# Patient Record
Sex: FEMALE | Race: WHITE | NOT HISPANIC OR LATINO
[De-identification: names, ages, dates, MRNs, and addresses within clinical notes are randomized per-mention and may not be internally consistent; named-entity substitution may affect disease eponyms.]

---

## 2017-03-20 ENCOUNTER — APPOINTMENT (OUTPATIENT)
Dept: OBGYN | Facility: CLINIC | Age: 52
End: 2017-03-20

## 2017-03-31 ENCOUNTER — APPOINTMENT (OUTPATIENT)
Dept: OBGYN | Facility: CLINIC | Age: 52
End: 2017-03-31

## 2017-03-31 VITALS
HEIGHT: 64 IN | WEIGHT: 122 LBS | SYSTOLIC BLOOD PRESSURE: 120 MMHG | DIASTOLIC BLOOD PRESSURE: 70 MMHG | BODY MASS INDEX: 20.83 KG/M2

## 2017-04-03 LAB — HPV HIGH+LOW RISK DNA PNL CVX: NEGATIVE

## 2017-04-05 ENCOUNTER — MEDICATION RENEWAL (OUTPATIENT)
Age: 52
End: 2017-04-05

## 2017-04-05 LAB — CYTOLOGY CVX/VAG DOC THIN PREP: NORMAL

## 2017-05-22 ENCOUNTER — OTHER (OUTPATIENT)
Age: 52
End: 2017-05-22

## 2017-09-13 ENCOUNTER — APPOINTMENT (OUTPATIENT)
Dept: RHEUMATOLOGY | Facility: CLINIC | Age: 52
End: 2017-09-13

## 2017-10-09 ENCOUNTER — APPOINTMENT (OUTPATIENT)
Dept: RHEUMATOLOGY | Facility: CLINIC | Age: 52
End: 2017-10-09
Payer: COMMERCIAL

## 2017-10-09 VITALS
HEART RATE: 82 BPM | SYSTOLIC BLOOD PRESSURE: 130 MMHG | WEIGHT: 127 LBS | TEMPERATURE: 98.4 F | OXYGEN SATURATION: 98 % | DIASTOLIC BLOOD PRESSURE: 82 MMHG | BODY MASS INDEX: 21.68 KG/M2 | HEIGHT: 64 IN

## 2017-10-09 DIAGNOSIS — M54.5 LOW BACK PAIN: ICD-10-CM

## 2017-10-09 PROCEDURE — 99204 OFFICE O/P NEW MOD 45 MIN: CPT

## 2017-10-23 ENCOUNTER — APPOINTMENT (OUTPATIENT)
Dept: RHEUMATOLOGY | Facility: CLINIC | Age: 52
End: 2017-10-23

## 2017-11-01 LAB
25(OH)D3 SERPL-MCNC: 32.6 NG/ML
ACE BLD-CCNC: 49 U/L
ALBUMIN SERPL ELPH-MCNC: 4.6 G/DL
ALP BLD-CCNC: 65 U/L
ALT SERPL-CCNC: 39 U/L
ANA PAT FLD IF-IMP: ABNORMAL
ANA SER IF-ACNC: ABNORMAL
ANION GAP SERPL CALC-SCNC: 15 MMOL/L
AST SERPL-CCNC: 41 U/L
B BURGDOR AB SER-IMP: NEGATIVE
B BURGDOR IGM PATRN SER IB-IMP: NEGATIVE
B BURGDOR18/20KD IGM SER QL IB: NORMAL
B BURGDOR18KD IGG SER QL IB: NORMAL
B BURGDOR23KD IGG SER QL IB: NORMAL
B BURGDOR23KD IGM SER QL IB: NORMAL
B BURGDOR28KD AB SER QL IB: NORMAL
B BURGDOR28KD IGG SER QL IB: NORMAL
B BURGDOR30KD AB SER QL IB: NORMAL
B BURGDOR30KD IGG SER QL IB: NORMAL
B BURGDOR31KD IGG SER QL IB: NORMAL
B BURGDOR31KD IGM SER QL IB: NORMAL
B BURGDOR39KD IGG SER QL IB: NORMAL
B BURGDOR39KD IGM SER QL IB: NORMAL
B BURGDOR41KD IGG SER QL IB: PRESENT
B BURGDOR41KD IGM SER QL IB: NORMAL
B BURGDOR45KD AB SER QL IB: NORMAL
B BURGDOR45KD IGG SER QL IB: NORMAL
B BURGDOR58KD AB SER QL IB: NORMAL
B BURGDOR58KD IGG SER QL IB: NORMAL
B BURGDOR66KD IGG SER QL IB: NORMAL
B BURGDOR66KD IGM SER QL IB: NORMAL
B BURGDOR93KD IGG SER QL IB: NORMAL
B BURGDOR93KD IGM SER QL IB: NORMAL
BASOPHILS # BLD AUTO: 0.05 K/UL
BASOPHILS NFR BLD AUTO: 1 %
BILIRUB SERPL-MCNC: 0.4 MG/DL
BUN SERPL-MCNC: 18 MG/DL
CALCIUM SERPL-MCNC: 10 MG/DL
CCP AB SER IA-ACNC: 22 UNITS
CHLORIDE SERPL-SCNC: 99 MMOL/L
CO2 SERPL-SCNC: 26 MMOL/L
CREAT SERPL-MCNC: 0.88 MG/DL
CRP SERPL-MCNC: <0.2 MG/DL
DSDNA AB SER-ACNC: <12 IU/ML
ENA SS-A AB SER IA-ACNC: <0.2 AL
ENA SS-B AB SER IA-ACNC: <0.2 AL
EOSINOPHIL # BLD AUTO: 0.3 K/UL
EOSINOPHIL NFR BLD AUTO: 5.8 %
ERYTHROCYTE [SEDIMENTATION RATE] IN BLOOD BY WESTERGREN METHOD: 8 MM/HR
GLUCOSE SERPL-MCNC: 102 MG/DL
HCT VFR BLD CALC: 43 %
HGB BLD-MCNC: 14.6 G/DL
HLA-B27 RELATED AG QL: NORMAL
IMM GRANULOCYTES NFR BLD AUTO: 0.2 %
LYMPHOCYTES # BLD AUTO: 1.87 K/UL
LYMPHOCYTES NFR BLD AUTO: 35.9 %
MAN DIFF?: NORMAL
MCHC RBC-ENTMCNC: 31.1 PG
MCHC RBC-ENTMCNC: 34 GM/DL
MCV RBC AUTO: 91.5 FL
MONOCYTES # BLD AUTO: 0.41 K/UL
MONOCYTES NFR BLD AUTO: 7.9 %
NEUTROPHILS # BLD AUTO: 2.57 K/UL
NEUTROPHILS NFR BLD AUTO: 49.2 %
PLATELET # BLD AUTO: 264 K/UL
POTASSIUM SERPL-SCNC: 4.3 MMOL/L
PROT SERPL-MCNC: 7.4 G/DL
RBC # BLD: 4.7 M/UL
RBC # FLD: 13 %
RF+CCP IGG SER-IMP: ABNORMAL
RHEUMATOID FACT SER QL: <7 IU/ML
SODIUM SERPL-SCNC: 140 MMOL/L
TSH SERPL-ACNC: 2.19 UIU/ML
WBC # FLD AUTO: 5.21 K/UL

## 2017-11-13 ENCOUNTER — APPOINTMENT (OUTPATIENT)
Dept: RHEUMATOLOGY | Facility: CLINIC | Age: 52
End: 2017-11-13
Payer: COMMERCIAL

## 2017-11-13 VITALS
SYSTOLIC BLOOD PRESSURE: 128 MMHG | OXYGEN SATURATION: 98 % | HEART RATE: 59 BPM | HEIGHT: 64 IN | WEIGHT: 127 LBS | BODY MASS INDEX: 21.68 KG/M2 | DIASTOLIC BLOOD PRESSURE: 76 MMHG

## 2017-11-13 DIAGNOSIS — Z23 ENCOUNTER FOR IMMUNIZATION: ICD-10-CM

## 2017-11-13 DIAGNOSIS — Z82.61 FAMILY HISTORY OF ARTHRITIS: ICD-10-CM

## 2017-11-13 PROCEDURE — 90686 IIV4 VACC NO PRSV 0.5 ML IM: CPT

## 2017-11-13 PROCEDURE — G0008: CPT

## 2017-11-13 PROCEDURE — 99214 OFFICE O/P EST MOD 30 MIN: CPT | Mod: 25

## 2017-11-14 PROBLEM — Z23 FLU VACCINE NEED: Status: ACTIVE | Noted: 2017-11-14

## 2017-11-14 PROBLEM — Z82.61 FAMILY HISTORY OF ARTHRITIS: Status: ACTIVE | Noted: 2017-11-14

## 2017-12-11 ENCOUNTER — APPOINTMENT (OUTPATIENT)
Dept: RHEUMATOLOGY | Facility: CLINIC | Age: 52
End: 2017-12-11

## 2018-03-19 ENCOUNTER — APPOINTMENT (OUTPATIENT)
Dept: OBGYN | Facility: CLINIC | Age: 53
End: 2018-03-19

## 2018-06-11 ENCOUNTER — APPOINTMENT (OUTPATIENT)
Dept: OBGYN | Facility: CLINIC | Age: 53
End: 2018-06-11
Payer: COMMERCIAL

## 2018-06-11 VITALS
HEIGHT: 64.5 IN | BODY MASS INDEX: 21.76 KG/M2 | DIASTOLIC BLOOD PRESSURE: 75 MMHG | SYSTOLIC BLOOD PRESSURE: 130 MMHG | WEIGHT: 129.03 LBS

## 2018-06-11 DIAGNOSIS — Z01.419 ENCOUNTER FOR GYNECOLOGICAL EXAMINATION (GENERAL) (ROUTINE) W/OUT ABNORMAL FINDINGS: ICD-10-CM

## 2018-06-11 PROCEDURE — 99396 PREV VISIT EST AGE 40-64: CPT

## 2018-08-29 ENCOUNTER — APPOINTMENT (OUTPATIENT)
Dept: RHEUMATOLOGY | Facility: CLINIC | Age: 53
End: 2018-08-29
Payer: COMMERCIAL

## 2018-08-29 ENCOUNTER — LABORATORY RESULT (OUTPATIENT)
Age: 53
End: 2018-08-29

## 2018-08-29 VITALS
WEIGHT: 126 LBS | HEART RATE: 97 BPM | SYSTOLIC BLOOD PRESSURE: 130 MMHG | DIASTOLIC BLOOD PRESSURE: 80 MMHG | OXYGEN SATURATION: 97 % | HEIGHT: 65 IN | BODY MASS INDEX: 20.99 KG/M2

## 2018-08-29 DIAGNOSIS — M25.50 PAIN IN UNSPECIFIED JOINT: ICD-10-CM

## 2018-08-29 LAB
ALBUMIN SERPL ELPH-MCNC: 4.5 G/DL
ALP BLD-CCNC: 64 U/L
ALT SERPL-CCNC: 25 U/L
ANION GAP SERPL CALC-SCNC: 15 MMOL/L
AST SERPL-CCNC: 34 U/L
BASOPHILS # BLD AUTO: 0.04 K/UL
BASOPHILS NFR BLD AUTO: 1 %
BILIRUB SERPL-MCNC: 0.4 MG/DL
BUN SERPL-MCNC: 15 MG/DL
CALCIUM SERPL-MCNC: 9.9 MG/DL
CHLORIDE SERPL-SCNC: 103 MMOL/L
CO2 SERPL-SCNC: 25 MMOL/L
CREAT SERPL-MCNC: 0.85 MG/DL
CREAT SPEC-SCNC: 88 MG/DL
CREAT/PROT UR: 0.1 RATIO
CRP SERPL-MCNC: 0.11 MG/DL
EOSINOPHIL # BLD AUTO: 0.22 K/UL
EOSINOPHIL NFR BLD AUTO: 5.3 %
ERYTHROCYTE [SEDIMENTATION RATE] IN BLOOD BY WESTERGREN METHOD: 10 MM/HR
GLUCOSE SERPL-MCNC: 100 MG/DL
HCT VFR BLD CALC: 43.1 %
HGB BLD-MCNC: 14.5 G/DL
IMM GRANULOCYTES NFR BLD AUTO: 0 %
LYMPHOCYTES # BLD AUTO: 1.58 K/UL
LYMPHOCYTES NFR BLD AUTO: 38.3 %
MAN DIFF?: NORMAL
MCHC RBC-ENTMCNC: 31.3 PG
MCHC RBC-ENTMCNC: 33.6 GM/DL
MCV RBC AUTO: 92.9 FL
MONOCYTES # BLD AUTO: 0.29 K/UL
MONOCYTES NFR BLD AUTO: 7 %
NEUTROPHILS # BLD AUTO: 2 K/UL
NEUTROPHILS NFR BLD AUTO: 48.4 %
PLATELET # BLD AUTO: 247 K/UL
POTASSIUM SERPL-SCNC: 4.5 MMOL/L
PROT SERPL-MCNC: 7.6 G/DL
PROT UR-MCNC: 9 MG/DL
RBC # BLD: 4.64 M/UL
RBC # FLD: 13.2 %
SODIUM SERPL-SCNC: 143 MMOL/L
WBC # FLD AUTO: 4.13 K/UL

## 2018-08-29 PROCEDURE — 99214 OFFICE O/P EST MOD 30 MIN: CPT

## 2018-09-02 ENCOUNTER — RX RENEWAL (OUTPATIENT)
Age: 53
End: 2018-09-02

## 2018-09-04 LAB
C3 SERPL-MCNC: 108 MG/DL
C4 SERPL-MCNC: 27 MG/DL
DSDNA AB SER-ACNC: 12 IU/ML
HBV SURFACE AG SER QL: NONREACTIVE
HCV AB SER QL: NONREACTIVE
HCV S/CO RATIO: 0.31 S/CO
HIV1+2 AB SPEC QL IA.RAPID: NONREACTIVE
HIVABINT: NORMAL
M TB IFN-G BLD-IMP: NEGATIVE
QUANTIFERON TB PLUS MITOGEN MINUS NIL: >10 IU/ML
QUANTIFERON TB PLUS NIL: 0.02 IU/ML
QUANTIFERON TB PLUS TB1 MINUS NIL: 0 IU/ML
QUANTIFERON TB PLUS TB2 MINUS NIL: 0 IU/ML

## 2018-10-17 ENCOUNTER — APPOINTMENT (OUTPATIENT)
Dept: RHEUMATOLOGY | Facility: CLINIC | Age: 53
End: 2018-10-17

## 2018-11-02 ENCOUNTER — APPOINTMENT (OUTPATIENT)
Dept: RHEUMATOLOGY | Facility: CLINIC | Age: 53
End: 2018-11-02

## 2018-11-02 ENCOUNTER — APPOINTMENT (OUTPATIENT)
Dept: RHEUMATOLOGY | Facility: CLINIC | Age: 53
End: 2018-11-02
Payer: COMMERCIAL

## 2018-11-02 VITALS
BODY MASS INDEX: 20.99 KG/M2 | WEIGHT: 126 LBS | SYSTOLIC BLOOD PRESSURE: 139 MMHG | OXYGEN SATURATION: 97 % | HEIGHT: 65 IN | DIASTOLIC BLOOD PRESSURE: 90 MMHG | HEART RATE: 69 BPM

## 2018-11-02 DIAGNOSIS — M25.519 PAIN IN UNSPECIFIED SHOULDER: ICD-10-CM

## 2018-11-02 PROCEDURE — 99213 OFFICE O/P EST LOW 20 MIN: CPT

## 2019-01-28 ENCOUNTER — MEDICATION RENEWAL (OUTPATIENT)
Age: 54
End: 2019-01-28

## 2019-03-04 ENCOUNTER — APPOINTMENT (OUTPATIENT)
Dept: RHEUMATOLOGY | Facility: CLINIC | Age: 54
End: 2019-03-04

## 2019-08-05 ENCOUNTER — RX RENEWAL (OUTPATIENT)
Age: 54
End: 2019-08-05

## 2019-09-18 ENCOUNTER — APPOINTMENT (OUTPATIENT)
Dept: RHEUMATOLOGY | Facility: CLINIC | Age: 54
End: 2019-09-18
Payer: COMMERCIAL

## 2019-09-18 VITALS
HEART RATE: 82 BPM | SYSTOLIC BLOOD PRESSURE: 150 MMHG | HEIGHT: 65 IN | BODY MASS INDEX: 20.99 KG/M2 | WEIGHT: 126 LBS | DIASTOLIC BLOOD PRESSURE: 90 MMHG | OXYGEN SATURATION: 98 %

## 2019-09-18 PROCEDURE — 99214 OFFICE O/P EST MOD 30 MIN: CPT

## 2019-09-21 NOTE — REVIEW OF SYSTEMS
[As Noted in HPI] : as noted in HPI [Joint Pain] : joint pain [Fever] : no fever [Chills] : no chills [Earache] : no earache [Red Eyes] : eyes not red [Eye Pain] : no eye pain [Nosebleeds] : no nosebleeds [Chest Pain] : no chest pain [Abdominal Pain] : no abdominal pain [Shortness Of Breath] : no shortness of breath [Vomiting] : no vomiting [Dysuria] : no dysuria [Skin Lesions] : no skin lesions [Confused] : no confusion [Convulsions] : no convulsions [Suicidal] : not suicidal [Proptosis] : no proptosis [Muscle Weakness] : no muscle weakness [Easy Bleeding] : no tendency for easy bleeding

## 2019-09-21 NOTE — HISTORY OF PRESENT ILLNESS
[FreeTextEntry1] : 55 y/o F here for follow up w/ RA w/ +CCP on Plaquenil that seemed to help her joint aches in the hands/MCPs/PIPs/ elbows and sees Optho for eye exams on it. \par Today states she notes some pain in the hands around the MCP/PIP; elbows; knee at times that is not fully controlled on HCQ per patient.\par States morning stiffness is all day. 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 \par States Advil dose help but not long enough and takes 400-600mg a day. \par No fever/chills, no rashes, no ulcers, feels dry eyes and will see Optho to check, + dry mouth, no raynaud's, no GI/ symptoms at this time.\par \par

## 2019-09-21 NOTE — PHYSICAL EXAM
[General Appearance - Alert] : alert [General Appearance - Well Nourished] : well nourished [Sclera] : the sclera and conjunctiva were normal [Extraocular Movements] : extraocular movements were intact [Outer Ear] : the ears and nose were normal in appearance [Nasal Cavity] : the nasal mucosa and septum were normal [Neck Appearance] : the appearance of the neck was normal [Respiration, Rhythm And Depth] : normal respiratory rhythm and effort [Auscultation Breath Sounds / Voice Sounds] : lungs were clear to auscultation bilaterally [Heart Rate And Rhythm] : heart rate was normal and rhythm regular [Heart Sounds] : normal S1 and S2 [Bowel Sounds] : normal bowel sounds [Abdomen Soft] : soft [Abdomen Tenderness] : non-tender [Cervical Lymph Nodes Enlarged Anterior Bilaterally] : anterior cervical [Supraclavicular Lymph Nodes Enlarged Bilaterally] : supraclavicular [No CVA Tenderness] : no ~M costovertebral angle tenderness [No Spinal Tenderness] : no spinal tenderness [Motor Tone] : muscle strength and tone were normal [] : no rash [Cranial Nerves] : cranial nerves 2-12 were intact [No Focal Deficits] : no focal deficits [Impaired Insight] : insight and judgment were intact [Mood] : the mood was normal [FreeTextEntry1] : tenderness in the Rt hand 2-3MCP and left hand 2-3PIP

## 2019-09-21 NOTE — ASSESSMENT
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 \par Notes some secondary Sjogren symptoms of dry eyes and dry mouth at times.\par -no recent labs in over a year and encouraged compliance \par -refill HCQ PO BID \par -seeing Optho regularly in 2 mo and on HCQ BID  \par -dry eyes: uses artificial tears PRN\par -dry mouth: biotene mouthwash PRN\par -referred for xray hands again to evaluate for structural changes, periarticular osteopenia, r/o erosions\par -referred for xray c-spine for neck pain to r/o AA instability, DDD \par \par Osteopenia: Dexa 5/5/2017 w/ osteopenia -1.9 \par -new Dexa referral provided \par -encouraged Ca+vitD supplementation\par -encouraged weight bearing exercises as tolerated.\par \par -educated on symptoms to monitor for in detail and alert us if any concerns.\par -f/u in 4-6 weeks \par

## 2019-10-30 ENCOUNTER — RX RENEWAL (OUTPATIENT)
Age: 54
End: 2019-10-30

## 2019-11-13 ENCOUNTER — APPOINTMENT (OUTPATIENT)
Dept: RHEUMATOLOGY | Facility: CLINIC | Age: 54
End: 2019-11-13

## 2020-01-12 ENCOUNTER — TRANSCRIPTION ENCOUNTER (OUTPATIENT)
Age: 55
End: 2020-01-12

## 2020-02-07 ENCOUNTER — APPOINTMENT (OUTPATIENT)
Dept: OBGYN | Facility: CLINIC | Age: 55
End: 2020-02-07
Payer: COMMERCIAL

## 2020-02-07 VITALS
HEIGHT: 65 IN | DIASTOLIC BLOOD PRESSURE: 80 MMHG | RESPIRATION RATE: 16 BRPM | WEIGHT: 127 LBS | SYSTOLIC BLOOD PRESSURE: 118 MMHG | BODY MASS INDEX: 21.16 KG/M2

## 2020-02-07 DIAGNOSIS — Z78.0 ASYMPTOMATIC MENOPAUSAL STATE: ICD-10-CM

## 2020-02-07 PROCEDURE — 99396 PREV VISIT EST AGE 40-64: CPT

## 2020-02-07 NOTE — PHYSICAL EXAM
[Awake] : awake [Alert] : alert [Soft] : soft [Oriented x3] : oriented to person, place, and time [Vulvar Atrophy] : vulvar atrophy [Labia Minora] : labia minora [Labia Majora] : labia major [Atrophy] : atrophy [Normal] : clitoris [No Bleeding] : there was no active vaginal bleeding [Pap Obtained] : a Pap smear was performed [Uterine Adnexae] : were not tender and not enlarged [LAD] : no lymphadenopathy [Acute Distress] : no acute distress [Thyroid Nodule] : no thyroid nodule [Goiter] : no goiter [Mass] : no breast mass [Nipple Discharge] : no nipple discharge [Axillary LAD] : no axillary lymphadenopathy [Tender] : non tender [Distended] : not distended [H/Smegaly] : no hepatosplenomegaly [Depressed Mood] : not depressed [Flat Affect] : affect not flat

## 2020-02-10 LAB — HPV HIGH+LOW RISK DNA PNL CVX: NOT DETECTED

## 2020-07-02 ENCOUNTER — APPOINTMENT (OUTPATIENT)
Dept: RHEUMATOLOGY | Facility: CLINIC | Age: 55
End: 2020-07-02
Payer: COMMERCIAL

## 2020-07-02 PROCEDURE — 99214 OFFICE O/P EST MOD 30 MIN: CPT | Mod: 95

## 2020-07-02 RX ORDER — HYDROXYCHLOROQUINE SULFATE 200 MG/1
200 TABLET, FILM COATED ORAL
Qty: 60 | Refills: 3 | Status: DISCONTINUED | COMMUNITY
Start: 2018-11-02 | End: 2020-07-02

## 2020-07-02 RX ORDER — PREDNISONE 5 MG/1
5 TABLET ORAL
Qty: 40 | Refills: 0 | Status: DISCONTINUED | COMMUNITY
Start: 2018-11-02 | End: 2020-07-02

## 2020-07-02 NOTE — ASSESSMENT
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.\par Also notes aches in the hands, elbows, shoulders, knees, ankles, toes.\par Notes some secondary Sjogren symptoms of dry eyes and dry mouth at times.\par -labs 10/30/2017 w/ +CCP: +ROBERT 1:80 homogenous \par -admits she has not been taking the HCQ regularly w/ pain and swelling in b/l 2nd MCPs, ankles/toes today \par -agrees to taking regularly HCQ 1 tab daily alternating w/ 2 tabs PO BID, 300mg, closer to wt based w/ prescription sent as below  \par -states she saw Optho around 2/2020 and told ok to continue HCQ and will be following up for q 6mo appt at [facility]\par -dry eyes: uses refresh tears PRN from her Optho\par -dry mouth: discussed biotene mouthwash PRN\par -reviewed xray b/l hands 10/12/2018 w/ mild b/l scaphotrapezium osteoarthropathy \par -reviewed xray c-spine 10/12/2018 w/ discogenic disease and arthrosis \par \par +ROBERT 1:80 homogenous: discussed likely being seen in the setting of RA\par -Clinically without much symptoms of CTD/lupus at this time and educated on symptoms to monitor for in detail if she evolves.\par -lab as below w/ disease activity markers as below to be complete; markers were stable in 8/2018 w/ DS-DNA neg; C3/C4 WNL then; urine prot/creat ratio WNL \par -will check thyroid abs as discussed +ROBERT can be seen with cross reactivity\par \par Osteopenia: Dexa 5/5/2017 w/ osteopenia -1.9 \par -states she did Dexa around 1/2020 at [facility] and requesting results for follow up \par -encouraged Ca+vitD supplementation\par -encouraged weight bearing exercises as tolerated.\par \par -educated on symptoms to monitor for in detail and alert us if any concerns.\par -f/u in 6-8 weeks w/ labs, Dexa or sooner as needed  \par

## 2020-07-02 NOTE — PHYSICAL EXAM
[General Appearance - Well Nourished] : well nourished [General Appearance - Alert] : alert [Extraocular Movements] : extraocular movements were intact [Sclera] : the sclera and conjunctiva were normal [] : no rash [Impaired Insight] : insight and judgment were intact [Mood] : the mood was normal [FreeTextEntry1] : tenderness in b/l 2nd MCP on Rt hand and Lt hand; tenderness in the ankles

## 2020-07-02 NOTE — REVIEW OF SYSTEMS
[As Noted in HPI] : as noted in HPI [Chills] : no chills [Fever] : no fever [Eye Pain] : no eye pain [Red Eyes] : eyes not red [Earache] : no earache [Chest Pain] : no chest pain [Nosebleeds] : no nosebleeds [Shortness Of Breath] : no shortness of breath [Vomiting] : no vomiting [Abdominal Pain] : no abdominal pain [Dysuria] : no dysuria [Skin Lesions] : no skin lesions [Confused] : no confusion [Convulsions] : no convulsions [Suicidal] : not suicidal [Swollen Glands] : no swollen glands [Proptosis] : no proptosis [Easy Bleeding] : no tendency for easy bleeding

## 2020-07-02 NOTE — HISTORY OF PRESENT ILLNESS
[FreeTextEntry1] : Verbal consent given for telehealth video visit on 7/2/2020 by patient, via [email or web address] with visit done from my [employer] laptop at [address] [place] [address] and patient at her home.\par \par 56 y/o F for follow up w/ RA w/ +CCP on Plaquenil that seemed to help her joint aches in the hands/MCPs/PIPs/ elbows and sees Optho for eye exams on it. \par Today states she notes some pain in the hands around the b/l MCP/PIP; elbows; knee, ankles/toes that are not controlled and admits to taking the Plaquenil once in a while instead of regularly. \par States morning stiffness is all day. 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 \par States Advil dose help but not long enough and takes 400-600mg a day. \par No fever/chills, no rashes, no ulcers, + dry eyes on Refresh w/ Optho, + dry mouth, no raynaud's, no GI/ symptoms at this time.\par

## 2020-09-09 ENCOUNTER — RX RENEWAL (OUTPATIENT)
Age: 55
End: 2020-09-09

## 2020-10-29 ENCOUNTER — APPOINTMENT (OUTPATIENT)
Dept: RHEUMATOLOGY | Facility: CLINIC | Age: 55
End: 2020-10-29
Payer: COMMERCIAL

## 2020-10-29 PROCEDURE — 99214 OFFICE O/P EST MOD 30 MIN: CPT | Mod: 95

## 2020-10-29 NOTE — PHYSICAL EXAM
[General Appearance - Alert] : alert [General Appearance - Well Nourished] : well nourished [Sclera] : the sclera and conjunctiva were normal [Extraocular Movements] : extraocular movements were intact [Motor Tone] : muscle strength and tone were normal [] : no rash [Impaired Insight] : insight and judgment were intact [Mood] : the mood was normal [FreeTextEntry1] : no synovitis or effusion on exam noted today

## 2020-10-29 NOTE — HISTORY OF PRESENT ILLNESS
[FreeTextEntry1] : Verbal consent given for telehealth video visit on 10/29/2020 by patient, via [employer] with visit done from my [employer] laptop at [address] [place] [address] and patient at her home in [place].\par \par 56 y/o F for follow up w/ RA w/ +CCP on Plaquenil states it is helping her joint aches in the hands/MCPs/PIPs/ elbows/knee/ankles/toes. States she forgot to do more recent labs from last visit and had labs from PCP faxed from 12/2019.\par States eye exam on HCQ was ok w/ Optho in the past and will be going for f/u. \par States at times can note pain in her Rt hand 2nd digit and will monitor for any swelling.\par States morning stiffness is down to 15mins now from a few hours before.\par Denies any fever/chills, no rashes, no ulcers, + dry eyes on Refresh w/ Optho, + dry mouth, no raynaud's, no GI/ symptoms at this time.\par

## 2020-10-29 NOTE — REVIEW OF SYSTEMS
[As Noted in HPI] : as noted in HPI [Fever] : no fever [Chills] : no chills [Eye Pain] : no eye pain [Red Eyes] : eyes not red [Earache] : no earache [Nosebleeds] : no nosebleeds [Chest Pain] : no chest pain [Shortness Of Breath] : no shortness of breath [Abdominal Pain] : no abdominal pain [Dysuria] : no dysuria [Confused] : no confusion [Suicidal] : not suicidal [Proptosis] : no proptosis [Muscle Weakness] : no muscle weakness [Easy Bleeding] : no tendency for easy bleeding

## 2020-10-29 NOTE — ASSESSMENT
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.\par Also notes aches in the b/l hands, elbows, shoulders, knees, ankles, toes.\par Notes some secondary Sjogren symptoms of dry eyes and dry mouth at times.\par -labs 10/30/2017 w/ +CCP: +ROBERT 1:80 homogenous \par -reviewed last labs 12/13/2020 w/ normal ESR; CBC ok; CMP w/ minimally raised AST=40 (Nl up to 35); NL ALT\par -labs as below requested again please on HCQ\par -refill HCQ 1 tab daily alternating w/ 2 tabs PO BID, 300mg, closer to wt based w/ prescription sent as below \par -reported she saw Optho around 2/2020 and told ok to continue HCQ w/ Optho and will be following up for q 6mo appt \par -dry eyes: uses refresh tears PRN from her Optho\par -dry mouth: discussed biotene mouthwash PRN\par -xray b/l hands 10/12/2018 w/ mild b/l scaphotrapezium osteoarthropathy \par -xray c-spine 10/12/2018 w/ discogenic disease and arthrosis \par \par +ROBERT 1:80 homogenous: discussed likely being seen in the setting of RA\par -Clinically without much symptoms of CTD/lupus at this time and educated on symptoms to monitor for in detail if she evolves.\par -lab as below w/ disease activity markers as below to be complete; markers were stable in 8/2018 w/ DS-DNA neg; C3/C4 WNL then; urine prot/creat ratio WNL \par -will check thyroid abs as discussed +ROBERT can be seen with cross reactivity\par \par Osteopenia: on Dexa 12/20/2019\par -encouraged Ca+vitD supplementation\par -encouraged weight bearing exercises as tolerated.\par \par -educated on symptoms to monitor for in detail and alert us if any concerns.\par -knows to stay up to date on health maintenance w/ PCP\par -f/u in 4-6 weeks w/ labs or sooner as needed \par

## 2020-11-18 ENCOUNTER — RX RENEWAL (OUTPATIENT)
Age: 55
End: 2020-11-18

## 2020-12-15 ENCOUNTER — RX RENEWAL (OUTPATIENT)
Age: 55
End: 2020-12-15

## 2020-12-21 ENCOUNTER — APPOINTMENT (OUTPATIENT)
Dept: RHEUMATOLOGY | Facility: CLINIC | Age: 55
End: 2020-12-21

## 2021-01-16 ENCOUNTER — RX RENEWAL (OUTPATIENT)
Age: 56
End: 2021-01-16

## 2021-02-24 ENCOUNTER — RX RENEWAL (OUTPATIENT)
Age: 56
End: 2021-02-24

## 2021-03-24 ENCOUNTER — APPOINTMENT (OUTPATIENT)
Dept: RHEUMATOLOGY | Facility: CLINIC | Age: 56
End: 2021-03-24
Payer: COMMERCIAL

## 2021-03-24 PROCEDURE — 99214 OFFICE O/P EST MOD 30 MIN: CPT | Mod: 95

## 2021-03-24 NOTE — ASSESSMENT
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.\par Also notes aches in the b/l hands, elbows, shoulders, knees, ankles, toes intermittently.\par Notes some secondary Sjogren symptoms of dry eyes and dry mouth at times.\par -states today joint aches controlled on HCQ and defers needing to add DMARDs as discussed \par -labs 10/30/2017 w/ +CCP: +ROBERT 1:80 homogenous \par -reviewed last labs 11/6/2020 w/ normal ESR; ROBERT neg; RF negative; CBC/CMPO ok; Nl vitD; TSH normal\par -labs as below to monitor requested \par -refill HCQ 200mg 1 tab PO daily w/ G6PD normal w/ prescription sent as below & defers higher dose as feels palpations then\par -reported she saw Optho, at [facility] around 2/2020 and told ok to continue HCQ w/ Optho and will be following up soon & optho referral provided as discussed \par -dry eyes: uses refresh tears PRN from her Optho\par -dry mouth: discussed biotene mouthwash PRN\par -xray b/l hands 10/12/2018 w/ mild b/l scaphotrapezium osteoarthropathy \par -xray c-spine 10/12/2018 w/ discogenic disease and arthrosis \par \par +ROBERT 1:80 homogenous: discussed likely being seen in the setting of RA and negative ROBERT on repeat\par -Clinically without much symptoms of CTD/lupus at this time and educated on symptoms to monitor for in detail if she evolves.\par -disease activity markers were stable in 11/2020 w/ DS-DNA neg; C3/C4 WNL; ROBERT negative \par \par Osteopenia: on Dexa 12/20/2019\par -encouraged Ca+vitD supplementation\par -encouraged weight bearing exercises as tolerated.\par \par -educated on symptoms to monitor for in detail and alert us if any concerns.\par -knows to stay up to date on health maintenance w/ PCP\par -f/u in 2-3 mo w/ labs or sooner as needed \par . \par

## 2021-03-24 NOTE — PHYSICAL EXAM
[General Appearance - Alert] : alert [General Appearance - In No Acute Distress] : in no acute distress [Sclera] : the sclera and conjunctiva were normal [Extraocular Movements] : extraocular movements were intact [] : no rash [Impaired Insight] : insight and judgment were intact [Mood] : the mood was normal [FreeTextEntry1] : tenderness Rt hand 2-3MCP w/o much synovitis on telehealth video today; full ROM in b/l shoulders w/ mild Rt shoulder tenderness on rotation

## 2021-03-24 NOTE — HISTORY OF PRESENT ILLNESS
[FreeTextEntry1] : Verbal consent given for telehealth video visit on 3/24/2021 by patient, via [employer] with visit done from my [employer] laptop at [address] [place] [address] and patient at her home in [place].\par \par 57 y/o F for follow up w/ RA w/ +CCP on Plaquenil states it is helping her joint aches in the hands/MCPs/PIPs/ elbows/knee/ankles/toes. States she decreased her HCQ 300mg total to HCQ 200mg 1 tab daily and prefers to keep this dose as feels higher dose of HCQ causes palpations. \par States eye exam on HCQ was ok w/ Optho in the past and will be going for f/u. \par States at times can note pain in her Rt hand 2nd digit and will monitor for any swelling.\par States morning stiffness is down to 15 mins now from a few hours before.\par Has full ROM in b/l shoulders, no pelvic/girdle stiffness and able to stand up without using her hands, no temporal pain/unremitting headaches, no vision changes, no jaw pain.\par Denies any fever/chills, no rashes, no ulcers, + dry eyes on Refresh w/ Optho, + dry mouth at times, no raynaud's, no GI/ symptoms at this time.\par

## 2021-03-24 NOTE — REVIEW OF SYSTEMS
[As Noted in HPI] : as noted in HPI [Fever] : no fever [Chills] : no chills [Eye Pain] : no eye pain [Red Eyes] : eyes not red [Nosebleeds] : no nosebleeds [Chest Pain] : no chest pain [Shortness Of Breath] : no shortness of breath [Abdominal Pain] : no abdominal pain [Vomiting] : no vomiting [Dysuria] : no dysuria [Confused] : no confusion [Suicidal] : not suicidal [Proptosis] : no proptosis [Easy Bleeding] : no tendency for easy bleeding

## 2021-04-14 ENCOUNTER — RX RENEWAL (OUTPATIENT)
Age: 56
End: 2021-04-14

## 2021-05-27 ENCOUNTER — APPOINTMENT (OUTPATIENT)
Dept: RHEUMATOLOGY | Facility: CLINIC | Age: 56
End: 2021-05-27

## 2021-06-07 ENCOUNTER — RX RENEWAL (OUTPATIENT)
Age: 56
End: 2021-06-07

## 2021-07-07 ENCOUNTER — RX RENEWAL (OUTPATIENT)
Age: 56
End: 2021-07-07

## 2021-09-23 ENCOUNTER — APPOINTMENT (OUTPATIENT)
Dept: RHEUMATOLOGY | Facility: CLINIC | Age: 56
End: 2021-09-23

## 2021-09-29 ENCOUNTER — RX RENEWAL (OUTPATIENT)
Age: 56
End: 2021-09-29

## 2021-09-29 DIAGNOSIS — R76.8 OTHER SPECIFIED ABNORMAL IMMUNOLOGICAL FINDINGS IN SERUM: ICD-10-CM

## 2021-10-05 LAB
25(OH)D3 SERPL-MCNC: 83.9 NG/ML
ALBUMIN SERPL ELPH-MCNC: 4.8 G/DL
ALP BLD-CCNC: 69 U/L
ALT SERPL-CCNC: 20 U/L
ANA SER IF-ACNC: NEGATIVE
ANION GAP SERPL CALC-SCNC: 16 MMOL/L
AST SERPL-CCNC: 27 U/L
BASOPHILS # BLD AUTO: 0.07 K/UL
BASOPHILS NFR BLD AUTO: 1.1 %
BILIRUB SERPL-MCNC: 0.3 MG/DL
BUN SERPL-MCNC: 16 MG/DL
C3 SERPL-MCNC: 100 MG/DL
C4 SERPL-MCNC: 25 MG/DL
CALCIUM SERPL-MCNC: 9.7 MG/DL
CCP AB SER IA-ACNC: 12 UNITS
CHLORIDE SERPL-SCNC: 103 MMOL/L
CO2 SERPL-SCNC: 22 MMOL/L
CREAT SERPL-MCNC: 0.81 MG/DL
CREAT SPEC-SCNC: 67 MG/DL
CREAT/PROT UR: 0.1 RATIO
CRP SERPL-MCNC: <3 MG/L
DSDNA AB SER-ACNC: 18 IU/ML
EOSINOPHIL # BLD AUTO: 0.24 K/UL
EOSINOPHIL NFR BLD AUTO: 3.8 %
ERYTHROCYTE [SEDIMENTATION RATE] IN BLOOD BY WESTERGREN METHOD: 18 MM/HR
GLUCOSE SERPL-MCNC: 107 MG/DL
HAV IGM SER QL: NONREACTIVE
HBV CORE IGM SER QL: NONREACTIVE
HBV SURFACE AG SER QL: NONREACTIVE
HCT VFR BLD CALC: 42.8 %
HCV AB SER QL: NONREACTIVE
HCV S/CO RATIO: 0.11 S/CO
HGB BLD-MCNC: 14.3 G/DL
IMM GRANULOCYTES NFR BLD AUTO: 0.2 %
LYMPHOCYTES # BLD AUTO: 2.2 K/UL
LYMPHOCYTES NFR BLD AUTO: 34.8 %
MAN DIFF?: NORMAL
MCHC RBC-ENTMCNC: 30.8 PG
MCHC RBC-ENTMCNC: 33.4 GM/DL
MCV RBC AUTO: 92.2 FL
MONOCYTES # BLD AUTO: 0.56 K/UL
MONOCYTES NFR BLD AUTO: 8.8 %
NEUTROPHILS # BLD AUTO: 3.25 K/UL
NEUTROPHILS NFR BLD AUTO: 51.3 %
PLATELET # BLD AUTO: 261 K/UL
POTASSIUM SERPL-SCNC: 4.2 MMOL/L
PROT SERPL-MCNC: 7.1 G/DL
PROT UR-MCNC: 6 MG/DL
RBC # BLD: 4.64 M/UL
RBC # FLD: 12.4 %
RF+CCP IGG SER-IMP: NEGATIVE
RHEUMATOID FACT SER QL: <10 IU/ML
SODIUM SERPL-SCNC: 141 MMOL/L
WBC # FLD AUTO: 6.33 K/UL

## 2021-10-06 ENCOUNTER — APPOINTMENT (OUTPATIENT)
Dept: RHEUMATOLOGY | Facility: CLINIC | Age: 56
End: 2021-10-06
Payer: COMMERCIAL

## 2021-10-06 DIAGNOSIS — M25.569 PAIN IN UNSPECIFIED KNEE: ICD-10-CM

## 2021-10-06 PROCEDURE — 99214 OFFICE O/P EST MOD 30 MIN: CPT | Mod: 95

## 2021-10-06 NOTE — HISTORY OF PRESENT ILLNESS
[FreeTextEntry1] : Verbal consent given for telehealth video visit on 10/6/2021 by patient, via [employer] with visit done from my [employer]phone at [address] [place] [address] and patient at her home on her I-phone.\par \par 55 y/o F for follow up w/ RA w/ hx of weak +CCP on Plaquenil states it is helping her joint aches in the hands/MCPs/PIPs/ elbows/knee/ankles/toes. States she is tolerating HCQ 200mg daily well and had deferred higher dose that caused palpation in the past. \par Today she states she notes achy pain in the b/l hands throughout, b/l wrists w/o effusion. States she will monitor for any swelling.\par States she notes intermittent pain in her toes.\par States she notes some intermittent achy pain rated 4/10 for severity in her knees w/o swelling or warmth w/ activity/stairs at times and denies any crystal arthropathy like attacks.\par States eye exam on HCQ was ok w/ Optho yesterday and told ok to continue HCQ w/ Optho, Dr. Dhaliwal. \par Has full ROM in b/l shoulders, no pelvic/girdle stiffness and able to stand up without using her hands, no temporal pain/unremitting headaches, no vision changes, no jaw pain.\par Denies any fever/chills, no rashes, no ulcers, + dry eyes was on Refresh w/ Optho, + dry mouth at times, no raynaud's, no GI/ symptoms at this time.\par

## 2021-10-06 NOTE — ASSESSMENT
[FreeTextEntry1] : 57 y/o F w/ hx of weak +CCP with pain in b/l hands around the MCPs and PIPs daily w/ swelling and stiffness with symptoms concerning for RA.\par Also notes aches in the b/l hands, elbows, shoulders, knees, ankles, toes intermittently.\par Notes some secondary Sjogren symptoms of dry eyes and dry mouth at times.\par -reviewed recent labs 9/30/2021 w/ normal ESR/CRP; CCP negative now; RF negative; ROBERT w/ IF negative; CBC/CMP ok; vit D high (to cut down on dose)  \par -taking HCQ 200mg daily, closer to wt based w/ reports of pain in hands/wrists\par -Referred for Ultrasound of b/l hands/wrists to evaluate for any subclinical synovitis\par -labs 10/30/2017 w/ +CCP: +ROBERT 1:80 homogenous \par -refill HCQ 200mg 1 tab PO daily w/ G6PD normal w/ prescription sent as below & had deferred higher dose as felt palpations then \par -reports she saw Dr. Dhaliwal 10/5/42687 and told ok to continue HCQ w/ Optho and will ask if dry eyes noted again & reports hx of of dry eyes w/ prior Optho\par -dry eyes: artificial tears PRN and discuss w/ Optho\par -dry mouth: discussed biotene mouthwash or spray or toothpaste PRN\par -xray b/l hands 10/12/2018 w/ mild b/l scaphotrapezium osteoarthropathy \par -xray c-spine 10/12/2018 w/ discogenic disease and arthrosis \par -Referred for xray of b/l feet to evaluate for structural changes, r/o erosions\par \par Knee pain -Referred for xray of b/l knees to evaluate for structural changes, OA\par -consider steroid injections \par \par +ROBERT 1:80 homogenous: discussed likely being seen in the setting of RA and Negative ROBRET on repeat\par -Clinically without much symptoms of CTD/lupus at this time and educated on symptoms to monitor for in detail if she evolves.\par -disease activity markers were stable in 11/2020 w/ DS-DNA neg; C3/C4 WNL; ROBERT negative \par \par Osteopenia: on Dexa 12/20/2019\par -Dexa referral put in to do after 12/20/2021\par -encouraged Ca+vitD supplementation\par -encouraged weight bearing exercises as tolerated.\par \par -educated on symptoms to monitor for in detail and alert us if any concerns.\par -knows to stay up to date on health maintenance w/ PCP\par -f/u in 2-3 mo w/ US hands, labs, xrays, Dexa or sooner as needed \par . \par

## 2021-10-06 NOTE — PHYSICAL EXAM
[General Appearance - Alert] : alert [General Appearance - In No Acute Distress] : in no acute distress [Sclera] : the sclera and conjunctiva were normal [Extraocular Movements] : extraocular movements were intact [] : no rash [Impaired Insight] : insight and judgment were intact [Mood] : the mood was normal [FreeTextEntry1] : tenderness Rt hand 2-3 PIP; LT hand 3-4 PIP; no synovitis noted over video camera per se

## 2021-12-02 ENCOUNTER — APPOINTMENT (OUTPATIENT)
Dept: OBGYN | Facility: CLINIC | Age: 56
End: 2021-12-02

## 2021-12-16 ENCOUNTER — RESULT CHARGE (OUTPATIENT)
Age: 56
End: 2021-12-16

## 2021-12-16 ENCOUNTER — APPOINTMENT (OUTPATIENT)
Dept: FAMILY MEDICINE | Facility: CLINIC | Age: 56
End: 2021-12-16
Payer: COMMERCIAL

## 2021-12-16 VITALS
HEIGHT: 65 IN | HEART RATE: 90 BPM | OXYGEN SATURATION: 98 % | SYSTOLIC BLOOD PRESSURE: 130 MMHG | DIASTOLIC BLOOD PRESSURE: 86 MMHG | WEIGHT: 128 LBS | BODY MASS INDEX: 21.33 KG/M2 | TEMPERATURE: 96.6 F

## 2021-12-16 DIAGNOSIS — R10.9 UNSPECIFIED ABDOMINAL PAIN: ICD-10-CM

## 2021-12-16 LAB
BILIRUB UR QL STRIP: NORMAL
GLUCOSE UR-MCNC: NORMAL
HCG UR QL: 0.2 EU/DL
HGB UR QL STRIP.AUTO: NORMAL
KETONES UR-MCNC: NORMAL
LEUKOCYTE ESTERASE UR QL STRIP: NORMAL
NITRITE UR QL STRIP: NORMAL
PH UR STRIP: 5.5
PROT UR STRIP-MCNC: NORMAL
SP GR UR STRIP: 1.02

## 2021-12-16 PROCEDURE — 99203 OFFICE O/P NEW LOW 30 MIN: CPT | Mod: 25

## 2021-12-16 PROCEDURE — 36415 COLL VENOUS BLD VENIPUNCTURE: CPT

## 2021-12-16 NOTE — PLAN
[FreeTextEntry1] : Will check blood and urine tests as above. Will order ultrasound of the gallbladder and kidneys. She will hydrate well and try lemon water since this may help to dissolve a kidney stone. More definitive treatment will be recommended once her test results are known.

## 2021-12-16 NOTE — PHYSICAL EXAM
[Soft] : abdomen soft [Non Tender] : non-tender [Non-distended] : non-distended [Normal Bowel Sounds] : normal bowel sounds [No CVA Tenderness] : no CVA  tenderness [Grossly Normal Strength/Tone] : grossly normal strength/tone [No Focal Deficits] : no focal deficits [Normal] : affect was normal and insight and judgment were intact

## 2021-12-16 NOTE — HISTORY OF PRESENT ILLNESS
[FreeTextEntry8] : Patient presents with right sided flank and abdominal pain. This began 3 weeks ago. She thought she pulled a muscle but this has not resolved. She felt that her right side looked swollen. She has a history of IBS and has had increased nausea but no vomiting. She has had no change in her bowel movements. She feels that has increased abdominal pain from certain foods and drinks (wine). She wonders if this is a gallstone or kidney stone. She has no dysuria and no hematuria.

## 2021-12-16 NOTE — ASSESSMENT
[FreeTextEntry1] : She has right flank pain radiating to the right upper and lower quadrant of her abdomen. Ddx includes a kidney stone or gallstones. She has no abdominal or flank tenderness but she does have moderate microscopic hematuria on automated U/A in the office. This suggests a renal stone more than a gallstone. She admits that she is often dehydrated.

## 2021-12-17 LAB
ALBUMIN SERPL ELPH-MCNC: 4.8 G/DL
ALP BLD-CCNC: 67 U/L
ALT SERPL-CCNC: 24 U/L
ANION GAP SERPL CALC-SCNC: 12 MMOL/L
APPEARANCE: CLEAR
AST SERPL-CCNC: 36 U/L
BACTERIA: NEGATIVE
BASOPHILS # BLD AUTO: 0.05 K/UL
BASOPHILS NFR BLD AUTO: 1.2 %
BILIRUB SERPL-MCNC: 0.5 MG/DL
BILIRUBIN URINE: NEGATIVE
BLOOD URINE: ABNORMAL
BUN SERPL-MCNC: 15 MG/DL
CALCIUM SERPL-MCNC: 9.7 MG/DL
CHLORIDE SERPL-SCNC: 103 MMOL/L
CO2 SERPL-SCNC: 26 MMOL/L
COLOR: YELLOW
CREAT SERPL-MCNC: 0.83 MG/DL
EOSINOPHIL # BLD AUTO: 0.22 K/UL
EOSINOPHIL NFR BLD AUTO: 5.4 %
GLUCOSE QUALITATIVE U: NEGATIVE
GLUCOSE SERPL-MCNC: 114 MG/DL
HCT VFR BLD CALC: 43.6 %
HGB BLD-MCNC: 14.2 G/DL
HYALINE CASTS: 0 /LPF
IMM GRANULOCYTES NFR BLD AUTO: 0.2 %
KETONES URINE: NEGATIVE
LEUKOCYTE ESTERASE URINE: NEGATIVE
LYMPHOCYTES # BLD AUTO: 1.59 K/UL
LYMPHOCYTES NFR BLD AUTO: 39.3 %
MAN DIFF?: NORMAL
MCHC RBC-ENTMCNC: 30.5 PG
MCHC RBC-ENTMCNC: 32.6 GM/DL
MCV RBC AUTO: 93.6 FL
MICROSCOPIC-UA: NORMAL
MONOCYTES # BLD AUTO: 0.4 K/UL
MONOCYTES NFR BLD AUTO: 9.9 %
NEUTROPHILS # BLD AUTO: 1.78 K/UL
NEUTROPHILS NFR BLD AUTO: 44 %
NITRITE URINE: NEGATIVE
PH URINE: 6
PLATELET # BLD AUTO: 244 K/UL
POTASSIUM SERPL-SCNC: 4 MMOL/L
PROT SERPL-MCNC: 7.2 G/DL
PROTEIN URINE: NORMAL
RBC # BLD: 4.66 M/UL
RBC # FLD: 12.7 %
RED BLOOD CELLS URINE: 2 /HPF
SODIUM SERPL-SCNC: 141 MMOL/L
SPECIFIC GRAVITY URINE: 1.02
SQUAMOUS EPITHELIAL CELLS: 2 /HPF
UROBILINOGEN URINE: NORMAL
WBC # FLD AUTO: 4.05 K/UL
WHITE BLOOD CELLS URINE: 2 /HPF

## 2021-12-19 LAB — BACTERIA UR CULT: NORMAL

## 2021-12-20 ENCOUNTER — NON-APPOINTMENT (OUTPATIENT)
Age: 56
End: 2021-12-20

## 2021-12-20 ENCOUNTER — TRANSCRIPTION ENCOUNTER (OUTPATIENT)
Age: 56
End: 2021-12-20

## 2021-12-23 ENCOUNTER — NON-APPOINTMENT (OUTPATIENT)
Age: 56
End: 2021-12-23

## 2022-01-06 ENCOUNTER — APPOINTMENT (OUTPATIENT)
Dept: OBGYN | Facility: CLINIC | Age: 57
End: 2022-01-06
Payer: COMMERCIAL

## 2022-01-06 VITALS
SYSTOLIC BLOOD PRESSURE: 134 MMHG | BODY MASS INDEX: 21.13 KG/M2 | WEIGHT: 127 LBS | TEMPERATURE: 97.8 F | DIASTOLIC BLOOD PRESSURE: 80 MMHG

## 2022-01-06 PROCEDURE — 82270 OCCULT BLOOD FECES: CPT

## 2022-01-06 PROCEDURE — 99396 PREV VISIT EST AGE 40-64: CPT

## 2022-01-06 RX ORDER — ESTRADIOL 10 UG/1
10 TABLET VAGINAL
Qty: 30 | Refills: 1 | Status: COMPLETED | COMMUNITY
Start: 2017-03-31 | End: 2022-01-06

## 2022-01-06 NOTE — PHYSICAL EXAM

## 2022-01-06 NOTE — HISTORY OF PRESENT ILLNESS
[FreeTextEntry1] : 57 yo uses a natural vag lub. not estrogen. She has no bleeding or hot flashes. No gyn complaints last colonoscopy 2 yrs ago just had dexa no results in computer.  [Currently Active] : currently active [Men] : men [Vaginal] : vaginal [No] : No

## 2022-01-06 NOTE — DISCUSSION/SUMMARY
[FreeTextEntry1] : pt was taking too much vit D \par Discussed with the pt the importance of exercise weight bearing,yoga, aerobics good variety, \par calcium totalling  mg between food and vitamins also vitamin D 2000iu daily, fish oil. \par ALso that any drop of blood after menapause is not good. \par FU in one year. \par  Discussed vaginal lubricants OTC like luvena moisturizers,ky ovule and relplense . There are also vaginal extrogens, and coconut oil with intercourse.\par discussed colonoscopy and derma appt.\par \par

## 2022-01-08 LAB — HPV HIGH+LOW RISK DNA PNL CVX: NOT DETECTED

## 2022-01-10 LAB — CYTOLOGY CVX/VAG DOC THIN PREP: ABNORMAL

## 2022-01-27 ENCOUNTER — TRANSCRIPTION ENCOUNTER (OUTPATIENT)
Age: 57
End: 2022-01-27

## 2022-01-27 ENCOUNTER — APPOINTMENT (OUTPATIENT)
Dept: RHEUMATOLOGY | Facility: CLINIC | Age: 57
End: 2022-01-27
Payer: COMMERCIAL

## 2022-01-27 DIAGNOSIS — M79.674 PAIN IN RIGHT TOE(S): ICD-10-CM

## 2022-01-27 DIAGNOSIS — M79.675 PAIN IN RIGHT TOE(S): ICD-10-CM

## 2022-01-27 DIAGNOSIS — Z71.2 PERSON CONSULTING FOR EXPLANATION OF EXAMINATION OR TEST FINDINGS: ICD-10-CM

## 2022-01-27 PROCEDURE — 99214 OFFICE O/P EST MOD 30 MIN: CPT | Mod: 95

## 2022-01-27 NOTE — PHYSICAL EXAM
[General Appearance - Alert] : alert [General Appearance - In No Acute Distress] : in no acute distress [Sclera] : the sclera and conjunctiva were normal [Extraocular Movements] : extraocular movements were intact [] : no rash [Impaired Insight] : insight and judgment were intact [Mood] : the mood was normal [FreeTextEntry1] : no synovitis or effusion on exam noted today; good ROM in b/l shoulders

## 2022-01-27 NOTE — REASON FOR VISIT
[Follow-Up: _____] : a [unfilled] follow-up visit [FreeTextEntry1] : RA; review labs/UA hands/wrists/Dexa/meds; Osteopenia. \par  \par

## 2022-01-27 NOTE — ASSESSMENT
[FreeTextEntry1] : 58 y/o F w/ hx of weak +CCP=22 with pain in b/l hands around the MCPs and PIPs daily w/ swelling and stiffness with symptoms concerning for RA.\par Also notes aches in the b/l hands, elbows, shoulders, knees, ankles, toes intermittently.\par Notes some secondary Sjogren symptoms of dry eyes and dry mouth at times.\par -reviewed labs 12/16/21 w/ CBC/CMP ok; urine w/ small blood (to f/u w/ her urologist and states monitors there from the past also w/ workup negative then & will follow up); 12/14/21 normal ESR/CRP; RO/LA negative \par -labs 9/30/2021 w/ normal ESR/CRP; CCP negative now on HCQ; RF negative; ROBERT w/ IF negative; CBC/CMP ok; vit D high (to cut down on dose); labs 10/30/2017 w/ +CCP=22: +ROBERT 1:80 homogenous \par -refill HCQ 200mg daily, closer to wt based w/ G6PD normal w/ prescription sent as below & had deferred higher dose as felt palpations then \par -labs as below to monitor \par -Reviewed US b/l hands/wrists on HCQ from [facility] radiology with tiny ganglion cyst in the Rt radial dorsal wrist without effusion or synovitis  \par -reports she saw Dr. Dhaliwal 10/5/60732 and told ok to continue HCQ w/ Optho w/ f/u in 6 mo and will ask if dry eyes noted again & reports hx of of dry eyes w/ prior Optho\par -dry eyes: artificial tears PRN and discuss w/ Optho\par -dry mouth: discussed biotene mouthwash or spray or toothpaste PRN\par -xray b/l hands 10/12/2018 w/ mild b/l scaphotrapezium osteoarthropathy \par -xray c-spine 10/12/2018 w/ discogenic disease and arthrosis \par -she defers other xrays \par \par +ROBERT 1:80 homogenous: discussed likely being seen in the setting of RA and Negative ROBERT on repeat\par -Clinically without much symptoms of CTD/lupus at this time and educated on symptoms to monitor for in detail if she evolves.\par -disease activity markers were stable in 11/2020 w/ DS-DNA neg; C3/C4 WNL; ROBERT negative \par \par Osteopenia: reviewed on Dexa 11/8/21\par -encouraged Ca+vitD supplementation\par -encouraged weight bearing exercises as tolerated.\par \par -educated on symptoms to monitor for in detail and alert us if any concerns.\par -knows to stay up to date on health maintenance w/ PCP\par -f/u in 3-4 mo w/ labs or sooner as needed \par . \par  \par

## 2022-01-28 ENCOUNTER — TRANSCRIPTION ENCOUNTER (OUTPATIENT)
Age: 57
End: 2022-01-28

## 2022-01-28 DIAGNOSIS — R31.9 HEMATURIA, UNSPECIFIED: ICD-10-CM

## 2022-02-21 ENCOUNTER — FORM ENCOUNTER (OUTPATIENT)
Age: 57
End: 2022-02-21

## 2022-03-23 ENCOUNTER — APPOINTMENT (OUTPATIENT)
Dept: RHEUMATOLOGY | Facility: CLINIC | Age: 57
End: 2022-03-23
Payer: COMMERCIAL

## 2022-03-23 VITALS
SYSTOLIC BLOOD PRESSURE: 165 MMHG | TEMPERATURE: 97.6 F | WEIGHT: 126 LBS | OXYGEN SATURATION: 99 % | HEART RATE: 79 BPM | BODY MASS INDEX: 20.97 KG/M2 | DIASTOLIC BLOOD PRESSURE: 105 MMHG

## 2022-03-23 VITALS — DIASTOLIC BLOOD PRESSURE: 80 MMHG | SYSTOLIC BLOOD PRESSURE: 120 MMHG

## 2022-03-23 DIAGNOSIS — M70.61 TROCHANTERIC BURSITIS, RIGHT HIP: ICD-10-CM

## 2022-03-23 PROCEDURE — 99214 OFFICE O/P EST MOD 30 MIN: CPT

## 2022-03-23 RX ORDER — CALCIUM CARBONATE/VITAMIN D3 600 MG-10
600-400 TABLET ORAL
Qty: 60 | Refills: 2 | Status: ACTIVE | COMMUNITY
Start: 2021-03-24 | End: 1900-01-01

## 2022-03-23 NOTE — HISTORY OF PRESENT ILLNESS
[FreeTextEntry1] : 56 y/o F for follow up w/ RA w/ hx of weak +CCP on Plaquenil states it is helping her joint aches in the hands/MCPs/PIPs/ elbows/knee/ankles/toes. States she is tolerating HCQ 200mg daily well and had deferred higher dose that caused palpation in the past. \par Today she has no swelling or redness or warmth of any joints on the  mg daily at this time.\par States eye exam on HCQ was ok w/ Optho 10/5/2021 and told ok to continue HCQ w/ Optho, Dr. Dhaliwal. \par Reports of some right sided hip pain on the right side of the hip today and no pain in the groin/hip joint so defers xray. Defers steroid injection offered for possible trochanteric bursitis. \par Reports of some soreness in the Rt shoulder w/ rotation w/ decent ROM and defers imaging of it or steroid injection for possible bursitis at this time.\yolanda Has good ROM in b/l shoulders, no pelvic/girdle stiffness and able to stand up without using her hands, no temporal pain/unremitting headaches, no vision changes, no jaw pain.\par Denies any fever/chills, no rashes, no ulcers, + dry eyes was on Refresh w/ Optho, + dry mouth at times, no raynaud's, no GI/ symptoms at this time.\par \par

## 2022-03-23 NOTE — REVIEW OF SYSTEMS
[As Noted in HPI] : as noted in HPI [Fever] : no fever [Chills] : no chills [Red Eyes] : eyes not red [Eye Pain] : no eye pain [Nosebleeds] : no nosebleeds [Chest Pain] : no chest pain [Shortness Of Breath] : no shortness of breath [Abdominal Pain] : no abdominal pain [Dysuria] : no dysuria [Confused] : no confusion [Suicidal] : not suicidal [Muscle Weakness] : no muscle weakness [Easy Bleeding] : no tendency for easy bleeding

## 2022-03-23 NOTE — PHYSICAL EXAM
[General Appearance - Alert] : alert [General Appearance - In No Acute Distress] : in no acute distress [Sclera] : the sclera and conjunctiva were normal [Extraocular Movements] : extraocular movements were intact [Outer Ear] : the ears and nose were normal in appearance [Neck Appearance] : the appearance of the neck was normal [Respiration, Rhythm And Depth] : normal respiratory rhythm and effort [Heart Rate And Rhythm] : heart rate was normal and rhythm regular [Heart Sounds] : normal S1 and S2 [Abdomen Soft] : soft [Abdomen Tenderness] : non-tender [Cervical Lymph Nodes Enlarged Anterior Bilaterally] : anterior cervical [Supraclavicular Lymph Nodes Enlarged Bilaterally] : supraclavicular [No CVA Tenderness] : no ~M costovertebral angle tenderness [Motor Tone] : muscle strength and tone were normal [] : no rash [No Focal Deficits] : no focal deficits [Impaired Insight] : insight and judgment were intact [Mood] : the mood was normal [FreeTextEntry1] : no synovitis or effusion on exam noted today; good ROM in b/l shoulders w/ some Rt shoulder tenderness w/ rotation; Rt trochanteric bursa tenderness

## 2022-03-23 NOTE — ASSESSMENT
[FreeTextEntry1] : 56 y/o F w/ hx of weak +CCP=22 with pain in b/l hands around the MCPs and PIPs daily w/ swelling and stiffness with symptoms concerning for RA.\par Also notes aches in the b/l hands, elbows, shoulders, knees, ankles, toes intermittently.\par Notes some secondary Sjogren symptoms of dry eyes and dry mouth at times.\par -No synovitis or effusion on exam noted today on  mg daily at this time\par -reviewed labs 12/16/21 w/ CBC/CMP ok; urine w/ small blood (saw her urologist to monitors there from the past also w/ workup negative reported); 12/14/21 normal ESR/CRP; RO/LA negative \par -labs 9/30/2021 w/ normal ESR/CRP; CCP negative now on HCQ; RF negative; ROBERT w/ IF negative; CBC/CMP ok; vit D high (to cut down on dose); labs 10/30/2017 w/ +CCP=22: +ROBERT 1:80 homogenous \par -refill HCQ 200mg daily, closer to wt based w/ G6PD normal w/ prescription sent as below & had deferred higher dose as felt palpations then \par -labs as below to monitor \par -US b/l hands/wrists on HCQ from [facility] with tiny ganglion cyst in the Rt radial dorsal wrist without effusion or synovitis on HCQ\par -reports she saw Dr. Dhaliwal 10/5/76625 and told ok to continue HCQ w/ Optho w/ f/u in 6 mo and will ask if dry eyes noted again & reports hx of of dry eyes w/ prior Optho\par -dry eyes: refresh PRN and discuss w/ Optho\par -dry mouth: discussed biotene mouthwash or spray or toothpaste PRN\par -xray b/l hands 10/12/2018 w/ mild b/l scaphotrapezium osteoarthropathy \par -xray c-spine 10/12/2018 w/ discogenic disease and arthrosis \par -she defers any xrays or imaging at this time\par \par Rt shoulder pain/bursitis: offered steroid injection and defers it.\par -defers imaging of shoulder and knows if pain worse can consider it \par -can use tylenol PRN or motrin w/ food sparingly if needed \par -Has good ROM in b/l shoulders, no pelvic/girdle stiffness and able to stand up without using her hands, no temporal pain/unremitting headaches, no vision changes, no jaw pain.\par  \par Rt trochanteric bursitis: defers steroid injection offered at this time\par -no hip/groin tenderness at this time\par \par +ROBRET 1:80 homogenous: discussed likely being seen in the setting of RA and Negative ROBERT on repeat\par -Clinically without much symptoms of CTD/lupus at this time and educated on symptoms to monitor for in detail if she evolves.\par -disease activity markers were stable in 9/30/21 w/ DS-DNA neg; C3/C4 WNL; ROBERT negative \par \par Osteopenia: on Dexa 11/8/21\par -encouraged Ca+vitD supplementation\par -encouraged weight bearing exercises as tolerated.\par \par -educated on symptoms to monitor for in detail and alert us if any concerns.\par -knows to stay up to date on health maintenance w/ PCP\par -f/u in 3 mo w/ labs or sooner as needed \par

## 2022-06-13 ENCOUNTER — NON-APPOINTMENT (OUTPATIENT)
Age: 57
End: 2022-06-13

## 2022-06-14 ENCOUNTER — APPOINTMENT (OUTPATIENT)
Dept: FAMILY MEDICINE | Facility: CLINIC | Age: 57
End: 2022-06-14
Payer: COMMERCIAL

## 2022-06-14 ENCOUNTER — NON-APPOINTMENT (OUTPATIENT)
Age: 57
End: 2022-06-14

## 2022-06-14 VITALS
HEIGHT: 65 IN | OXYGEN SATURATION: 99 % | BODY MASS INDEX: 20.66 KG/M2 | DIASTOLIC BLOOD PRESSURE: 96 MMHG | TEMPERATURE: 97.7 F | WEIGHT: 124 LBS | HEART RATE: 99 BPM | SYSTOLIC BLOOD PRESSURE: 160 MMHG

## 2022-06-14 VITALS — DIASTOLIC BLOOD PRESSURE: 86 MMHG | SYSTOLIC BLOOD PRESSURE: 152 MMHG

## 2022-06-14 DIAGNOSIS — R00.2 PALPITATIONS: ICD-10-CM

## 2022-06-14 PROCEDURE — 93000 ELECTROCARDIOGRAM COMPLETE: CPT

## 2022-06-14 PROCEDURE — 99213 OFFICE O/P EST LOW 20 MIN: CPT | Mod: 25

## 2022-06-14 RX ORDER — ACETAMINOPHEN 325 MG/1
TABLET, FILM COATED ORAL
Refills: 0 | Status: DISCONTINUED | COMMUNITY
End: 2022-06-14

## 2022-06-14 RX ORDER — IBUPROFEN 200 MG/1
TABLET, COATED ORAL
Refills: 0 | Status: DISCONTINUED | COMMUNITY
End: 2022-06-14

## 2022-06-14 NOTE — PHYSICAL EXAM
[No Murmur] : no murmur heard [No Edema] : there was no peripheral edema [Normal] : no rash [Coordination Grossly Intact] : coordination grossly intact [No Focal Deficits] : no focal deficits [Normal Gait] : normal gait [Normal Affect] : the affect was normal [Normal Insight/Judgement] : insight and judgment were intact [de-identified] : borderline tachycardic, rare extra beat appreciated

## 2022-06-14 NOTE — HEALTH RISK ASSESSMENT
[Former] : Former [Yes] : Yes [2 - 3 times a week (3 pts)] : 2 - 3  times a week (3 points) [1 or 2 (0 pts)] : 1 or 2 (0 points) [Never (0 pts)] : Never (0 points) [No] : In the past 12 months have you used drugs other than those required for medical reasons? No [No falls in past year] : Patient reported no falls in the past year [0] : 2) Feeling down, depressed, or hopeless: Not at all (0) [PHQ-2 Negative - No further assessment needed] : PHQ-2 Negative - No further assessment needed [YearQuit] : 1991 [Audit-CScore] : 3 [de-identified] : active, exercises regularly [de-identified] : good, balanced [YPH0Fomsm] : 0

## 2022-06-14 NOTE — ASSESSMENT
[FreeTextEntry1] : Patient is a 56yo female presenting to the office for evaluation of palpitations, disability doctor told her she had an extra heart beat and needs to be evaluated.  Pt has intermittent palpitations which are not new.  Denies CP, SOB.  No PE RF.\par \par Palpitations\par - EKG in office ST, no acute ST/T changes, no arrhythmias or extra beats on EKG.\par - BP elevated initially, improved slightly on repeat, admits to being very anxious.\par - Pt to be evaluated by Cardiology, Dr. Srinivasan, today for further evaluation and work up.\par - Offered b/w, pt declines.  States she has b/w done with Rheum regularly.  Will re-consider if persistent/worsening symptoms.\par \par Call the office or go to the ED immediately if you develop new, worsening or concerning symptoms including high fever, severe headache/worst headache of your life, confusion, dizziness/lightheadedness, loss of consciousness, severe chest pain, difficulty breathing, shortness of breath, severe abdominal pain, excessive vomiting/diarrhea, inability to feel/move the extremities, or any other concerning symptoms.\par

## 2022-06-14 NOTE — HISTORY OF PRESENT ILLNESS
[FreeTextEntry8] : Patient is a 58yo female presenting to the office complaining of palpitations.\par Pt states she was at a disability doctor appointment, was told she had an extra heart beat\par states she intermittently feels a "punch" feeling in the chest, attributed to anxiety.\par denies chest pain, SOB.\par Denies recent travel, recent surgeries, pain/swelling of the legs, hormone use, or history of blood clots.\par states she was having increased palpitations about 1-2 months ago, associated with allergy exacerbation, was not taking medications at that time.\par pt takes Calm supplement for anxiety, has Xanax PRN but does not take regularly.\par does not follow with psychiatry/therapist\par \par pt has never seen Cardiology.\par Father history of CVA.  No other heart issues in the family.

## 2022-06-14 NOTE — REVIEW OF SYSTEMS
[Palpitations] : palpitations [Anxiety] : anxiety [Negative] : Heme/Lymph [Chest Pain] : no chest pain [Lower Ext Edema] : no lower extremity edema [Orthopnea] : no orthopnea [Suicidal] : not suicidal [Depression] : no depression

## 2022-06-22 ENCOUNTER — RX RENEWAL (OUTPATIENT)
Age: 57
End: 2022-06-22

## 2022-06-23 ENCOUNTER — APPOINTMENT (OUTPATIENT)
Dept: RHEUMATOLOGY | Facility: CLINIC | Age: 57
End: 2022-06-23

## 2022-07-10 ENCOUNTER — FORM ENCOUNTER (OUTPATIENT)
Age: 57
End: 2022-07-10

## 2022-07-28 ENCOUNTER — APPOINTMENT (OUTPATIENT)
Dept: OBGYN | Facility: CLINIC | Age: 57
End: 2022-07-28

## 2022-07-28 VITALS
HEIGHT: 65 IN | DIASTOLIC BLOOD PRESSURE: 80 MMHG | WEIGHT: 124 LBS | HEART RATE: 80 BPM | BODY MASS INDEX: 20.66 KG/M2 | SYSTOLIC BLOOD PRESSURE: 130 MMHG | RESPIRATION RATE: 16 BRPM

## 2022-07-28 DIAGNOSIS — N89.8 OTHER SPECIFIED NONINFLAMMATORY DISORDERS OF VAGINA: ICD-10-CM

## 2022-07-28 PROCEDURE — 99214 OFFICE O/P EST MOD 30 MIN: CPT

## 2022-07-28 NOTE — HISTORY OF PRESENT ILLNESS
[FreeTextEntry1] : 58 yo was having sex and her left labia minora cot a little cut after. she has vaginal dryness and uses a moisturizer 2x a week mostly .

## 2022-07-28 NOTE — PHYSICAL EXAM
[Appropriately responsive] : appropriately responsive [Alert] : alert [No Acute Distress] : no acute distress [Oriented x3] : oriented x3 [Vulvar Atrophy] : vulvar atrophy [Labia Majora] : normal [Labia Minora] : normal [Normal] : normal [Atrophy] : atrophy [FreeTextEntry4] : culture done

## 2022-08-01 LAB
CANDIDA VAG CYTO: NOT DETECTED
G VAGINALIS+PREV SP MTYP VAG QL MICRO: NOT DETECTED
T VAGINALIS VAG QL WET PREP: NOT DETECTED

## 2022-09-12 ENCOUNTER — RX RENEWAL (OUTPATIENT)
Age: 57
End: 2022-09-12

## 2022-09-13 ENCOUNTER — APPOINTMENT (OUTPATIENT)
Dept: FAMILY MEDICINE | Facility: CLINIC | Age: 57
End: 2022-09-13

## 2022-09-13 VITALS — DIASTOLIC BLOOD PRESSURE: 82 MMHG | SYSTOLIC BLOOD PRESSURE: 150 MMHG

## 2022-09-13 VITALS
WEIGHT: 124 LBS | HEIGHT: 65 IN | HEART RATE: 100 BPM | BODY MASS INDEX: 20.66 KG/M2 | OXYGEN SATURATION: 100 % | TEMPERATURE: 97 F

## 2022-09-13 PROCEDURE — 99396 PREV VISIT EST AGE 40-64: CPT | Mod: 25

## 2022-09-13 PROCEDURE — 36415 COLL VENOUS BLD VENIPUNCTURE: CPT

## 2022-09-13 NOTE — HISTORY OF PRESENT ILLNESS
[FreeTextEntry1] : CPE. [de-identified] : Patient is a 56yo female with PMH RA, HTN who presents to the office for CPE.  \par \par Last CPE:  12/11/2019, Liliana Gotti. \par GYN Exam:  01/06/2022, Dr. Alfaro.  Pap negative at that time. \par Mammogram:  05/2022, BIRADS-2, repeat 1 year. \par DEXA:  12/2021 osteopenia.  Repeat 2023. \par Colonoscopy:  02/2019 internal hemorrhoids, repeat 10 years. \par Ophthalmology:  due now, [facility].  Last visit  01/2022, plans to schedule in near future.  Has f/u every 6 months on Plaquenil.\par Dermatology:   UTD.\par Dentist:  UTD.\par Shingles:  hx chicken pox as a child.  Will consider, will discuss with Dr. Maciel.\par Flu:  declines.\par Tdap:  unknown last Tdap, recommended today, pt declines.\par COVID:  [employer] x2.\par LMP:  12 years ago, denies breakthrough bleeding.\par \par Pt seen at our office for evaluation of palpitations on 06/14/22.  Pt was referred to Cardiology, Dr. Mattson.  Pt had echo and stress testing which were negative.  Pt was started on Amlodipine 5mg daily and tolerating well.  Pt does not check BP at home.  Denies HA, dizziness, lightheadedness, CP, SOB, LE edema.\par \par Rheumatology:  Dr. Maciel.  Follows for RA.  Pt feels she has poor hand  strength.  Pt states Plaquenil is helping but still not where she would like to be.  Admits to intermittent pain.\par \par Pt has history of anxiety.  Takes Xanax sparingly as needed.  Last RF >1 year ago, ISTOP reference number [id number].  Requesting RF today.  Not interested in SSRIs.  Pt has mild depression 2/2 unable to work because of RA.  States she feels better when talking to friends, not interested in therapy or SSRIs.

## 2022-09-13 NOTE — HEALTH RISK ASSESSMENT
[Good] : ~his/her~  mood as  good [Never] : Never [Yes] : Yes [2 - 3 times a week (3 pts)] : 2 - 3  times a week (3 points) [1 or 2 (0 pts)] : 1 or 2 (0 points) [Never (0 pts)] : Never (0 points) [No] : In the past 12 months have you used drugs other than those required for medical reasons? No [No falls in past year] : Patient reported no falls in the past year [0] : 1) Little interest or pleasure doing things: Not at all (0) [1] : 2) Feeling down, depressed, or hopeless for several days (1) [PHQ-2 Negative - No further assessment needed] : PHQ-2 Negative - No further assessment needed [HIV test declined] : HIV test declined [Hepatitis C test declined] : Hepatitis C test declined [None] : None [With Significant Other] : lives with significant other [Unemployed] : unemployed [High School] : high school [] :  [# Of Children ___] : has [unfilled] children [Sexually Active] : sexually active [Feels Safe at Home] : Feels safe at home [Fully functional (bathing, dressing, toileting, transferring, walking, feeding)] : Fully functional (bathing, dressing, toileting, transferring, walking, feeding) [Fully functional (using the telephone, shopping, preparing meals, housekeeping, doing laundry, using] : Fully functional and needs no help or supervision to perform IADLs (using the telephone, shopping, preparing meals, housekeeping, doing laundry, using transportation, managing medications and managing finances) [Smoke Detector] : smoke detector [Carbon Monoxide Detector] : carbon monoxide detector [Seat Belt] :  uses seat belt [Sunscreen] : uses sunscreen [With Patient/Caregiver] : , with patient/caregiver [Reviewed no changes] : Reviewed, no changes [I will adhere to the patient's wishes.] : I will adhere to the patient's wishes. [No Retinopathy] : No retinopathy [Patient reported mammogram was normal] : Patient reported mammogram was normal [Patient reported PAP Smear was normal] : Patient reported PAP Smear was normal [Patient reported bone density results were abnormal] : Patient reported bone density results were abnormal [Patient reported colonoscopy was normal] : Patient reported colonoscopy was normal [de-identified] : socially as teenager [Audit-CScore] : 3 [de-identified] : exercises occasionally, limited 2/2 RA. [de-identified] : well balanced, trying to watch salt intake [[id number]] : 1 [EyeExamDate] : 01/2022 [Change in mental status noted] : No change in mental status noted [Language] : denies difficulty with language [High Risk Behavior] : no high risk behavior [Reports changes in hearing] : Reports no changes in hearing [Reports changes in vision] : Reports no changes in vision [Reports changes in dental health] : Reports no changes in dental health [Guns at Home] : no guns at home [Travel to Developing Areas] : does not  travel to developing areas [MammogramDate] : 05/2022 [PapSmearDate] : 01/2022 [BoneDensityDate] : 12/2021 [BoneDensityComments] : osteopenia, repeat 2 years [ColonoscopyDate] : 02/2019 [ColonoscopyComments] : internal hemorrhoids otherwise WNL, repeat 10 years [FreeTextEntry2] : previously dental hygienist; 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

## 2022-09-13 NOTE — REVIEW OF SYSTEMS
[Joint Pain] : joint pain [Joint Stiffness] : joint stiffness [Anxiety] : anxiety [Depression] : depression [Negative] : Heme/Lymph [Suicidal] : not suicidal [Insomnia] : no insomnia [FreeTextEntry9] : chronic joint pain/stiffness 2/2 RA

## 2022-09-13 NOTE — ASSESSMENT
[FreeTextEntry1] : Patient is a 56yo female with PMH RA, HTN who presents to the office for CPE.  \par \par Health Maintenance\par - Due for Shingrix, will consult with Rheumatology to determine if eligible on current medication regimen.\par - Due for flu shot and Tdap, both offered today and declined.\par - UTD with remainder of HCM.\par - EKG deferred, pt UTD with Cardiology and has no active complaints.  Has f/u scheduled with Dr. Srinivasan in 10/2022.\par - Fasting labs drawn in office.\par - Eat plenty of fruits and vegetables, especially deeply colored fruits/vegetables (such as leafy greens, peaches) that are more nutrient-dense.  Continue to work hard on diet and exercise, limiting/avoiding saturated fat, fatty foods, greasy foods, red meats, white flour-based carbohydrates (cookies, cakes, white bread, white rice), and added sugars.  Chose whole grain foods and products made with whole grains over refined grains and white flour-based carbohydrates.  Avoid beverages and food with added sugar.  Limit salt intake to improve blood pressure.  Limit alcohol intake.\par - Try and incorporate 30 minutes of aerobic exercise to your daily routine.\par \par Anxiety/Depression\par - Symptoms mild, mostly associated with progressing RA, unable to work.\par - PHQ-2 negative.\par - Uses Xanax very sparingly as needed.  Has not had RF in the past 1 year, ISTOP confirmed.\par - RX for Xanax 0.25mg daily PRN sent to pharmacy.  To be taken very sparingly.\par - Recommend therapist, pt declines.\par - Pt not interested in SSRIs at this time.\par - Pt knows to RTO if symptoms persist/worsen.\par \par RA\par - Continue Plaquenil and routine f/u with Rheumatology, Dr. Maciel.\par - Pt frustrated with decreased dexterity, weakness of the hands.  RX for PT provided.  Pt encouraged to research RA specialists.\par \par HTN\par - BP elevated in office today.  Pt admits she does not monitor BP at home.\par - Pt currently taking Amlodipine 5mg daily.  Pt had just taken BP medication when I walked into the exam room for evaluation.\par - Monitor blood pressure at home, keep log, alert office if persistently elevated to consider medication adjustment.\par - Pt has appointment for f/u with Dr. Srinivasan (Cardiology) in 10/2022.\par - Alert office if you develop any new, worsening or concerning symptoms including headache, vision changes, dizziness, loss of consciousness, chest pain, shortness of breath, or lower extremity edema.\par \par Call the office or go to the ED immediately if you develop new, worsening or concerning symptoms including high fever, severe headache/worst headache of your life, confusion, dizziness/lightheadedness, loss of consciousness, severe chest pain, difficulty breathing, shortness of breath, severe abdominal pain, excessive vomiting/diarrhea, inability to feel/move the extremities, or any other concerning symptoms.\par

## 2022-09-14 LAB
25(OH)D3 SERPL-MCNC: 50.6 NG/ML
ALBUMIN SERPL ELPH-MCNC: 4.7 G/DL
ALP BLD-CCNC: 67 U/L
ALT SERPL-CCNC: 21 U/L
ANION GAP SERPL CALC-SCNC: 13 MMOL/L
AST SERPL-CCNC: 24 U/L
BASOPHILS # BLD AUTO: 0.05 K/UL
BASOPHILS NFR BLD AUTO: 1.1 %
BILIRUB SERPL-MCNC: 0.4 MG/DL
BUN SERPL-MCNC: 14 MG/DL
CALCIUM SERPL-MCNC: 9.9 MG/DL
CHLORIDE SERPL-SCNC: 103 MMOL/L
CHOLEST SERPL-MCNC: 295 MG/DL
CO2 SERPL-SCNC: 27 MMOL/L
CREAT SERPL-MCNC: 0.78 MG/DL
EGFR: 89 ML/MIN/1.73M2
EOSINOPHIL # BLD AUTO: 0.23 K/UL
EOSINOPHIL NFR BLD AUTO: 4.9 %
ESTIMATED AVERAGE GLUCOSE: 120 MG/DL
GLUCOSE SERPL-MCNC: 102 MG/DL
HBA1C MFR BLD HPLC: 5.8 %
HCT VFR BLD CALC: 45.7 %
HDLC SERPL-MCNC: 91 MG/DL
HGB BLD-MCNC: 14.8 G/DL
IMM GRANULOCYTES NFR BLD AUTO: 0.2 %
LDLC SERPL CALC-MCNC: 168 MG/DL
LYMPHOCYTES # BLD AUTO: 1.53 K/UL
LYMPHOCYTES NFR BLD AUTO: 32.9 %
MAN DIFF?: NORMAL
MCHC RBC-ENTMCNC: 30.7 PG
MCHC RBC-ENTMCNC: 32.4 GM/DL
MCV RBC AUTO: 94.8 FL
MONOCYTES # BLD AUTO: 0.38 K/UL
MONOCYTES NFR BLD AUTO: 8.2 %
NEUTROPHILS # BLD AUTO: 2.45 K/UL
NEUTROPHILS NFR BLD AUTO: 52.7 %
NONHDLC SERPL-MCNC: 204 MG/DL
PLATELET # BLD AUTO: 232 K/UL
POTASSIUM SERPL-SCNC: 4.6 MMOL/L
PROT SERPL-MCNC: 7.2 G/DL
RBC # BLD: 4.82 M/UL
RBC # FLD: 13.2 %
SODIUM SERPL-SCNC: 144 MMOL/L
TRIGL SERPL-MCNC: 183 MG/DL
TSH SERPL-ACNC: 2.64 UIU/ML
WBC # FLD AUTO: 4.65 K/UL

## 2022-09-20 ENCOUNTER — NON-APPOINTMENT (OUTPATIENT)
Age: 57
End: 2022-09-20

## 2022-10-12 ENCOUNTER — RX RENEWAL (OUTPATIENT)
Age: 57
End: 2022-10-12

## 2022-10-17 ENCOUNTER — APPOINTMENT (OUTPATIENT)
Dept: FAMILY MEDICINE | Facility: CLINIC | Age: 57
End: 2022-10-17

## 2022-10-17 VITALS
HEIGHT: 65 IN | OXYGEN SATURATION: 99 % | SYSTOLIC BLOOD PRESSURE: 150 MMHG | BODY MASS INDEX: 20.66 KG/M2 | WEIGHT: 124 LBS | DIASTOLIC BLOOD PRESSURE: 90 MMHG | TEMPERATURE: 97.3 F | HEART RATE: 81 BPM

## 2022-10-17 PROCEDURE — 99213 OFFICE O/P EST LOW 20 MIN: CPT

## 2022-10-17 NOTE — HISTORY OF PRESENT ILLNESS
[FreeTextEntry8] : Pt is a 58yo female presenting to the office complaining of sinusitis, cough.\par Pt reports NC, PND, cough, earache since last Monday.\par Pt states she woke up last night with subjective fever with sweats, body aches.\par Pt states she coughing, productive of green/yellow phlegm.\par Denies chest pain or SOB.\par Pt's  was sick with similar.\par Pt has been COVID negative at home multiple times.

## 2022-10-17 NOTE — REVIEW OF SYSTEMS
[Earache] : earache [Nasal Discharge] : nasal discharge [Sore Throat] : sore throat [Postnasal Drip] : postnasal drip [Shortness Of Breath] : no shortness of breath [Wheezing] : no wheezing [Cough] : cough [Easy Bleeding] : no easy bleeding [Easy Bruising] : no easy bruising [Swollen Glands] : swollen glands [Negative] : Psychiatric

## 2022-10-17 NOTE — PHYSICAL EXAM
[No Edema] : there was no peripheral edema [Normal] : no rash [Coordination Grossly Intact] : coordination grossly intact [No Focal Deficits] : no focal deficits [Normal Gait] : normal gait [Normal Affect] : the affect was normal [Normal Insight/Judgement] : insight and judgment were intact [de-identified] : effusions bilateral TMs, left TM ear with blunted light reflex.  nasal mucosa edematous/erythematous with purulent drainage; PND, no exudates. [de-identified] : left anterior cervical LAD

## 2022-10-17 NOTE — ASSESSMENT
[FreeTextEntry1] : Pt is a 56yo female presenting to the office complaining of 1 week worsening sinusitis, subjective fever last night, LAD.\par \par Sinusitis\par - Augmentin BID x10 days with food.\par - Flonase.\par - Supportive care including increased fluids, Ibuprofen/Acetaminophen as needed for pain/aches/fevers, OTC mucolytics(Mucinex or Robitussin)/nasal decongestants (Sudafed or Coricidin), nasal saline spray or Neti pot as needed.  Also recommend use of nasal steroid sprays (i.e. Flonase), Afrin (OTC decongestant spray) used SPARINGLY (not for more than 2-3 days in a row) can help decrease nasal congestion and help sinuses to drain.\par - Call the office or go to the ED immediately if you develop new, worsening or concerning symptoms including high fever, severe headache/worst headache of your life, confusion, dizziness/lightheadedness, loss of consciousness, severe chest pain, difficulty breathing, shortness of breath, severe abdominal pain, excessive vomiting/diarrhea, inability to feel/move the extremities, or any other concerning symptoms.\par

## 2022-10-27 ENCOUNTER — NON-APPOINTMENT (OUTPATIENT)
Age: 57
End: 2022-10-27

## 2022-12-02 ENCOUNTER — TRANSCRIPTION ENCOUNTER (OUTPATIENT)
Age: 57
End: 2022-12-02

## 2022-12-05 ENCOUNTER — APPOINTMENT (OUTPATIENT)
Dept: RHEUMATOLOGY | Facility: CLINIC | Age: 57
End: 2022-12-05
Payer: COMMERCIAL

## 2022-12-05 ENCOUNTER — RX RENEWAL (OUTPATIENT)
Age: 57
End: 2022-12-05

## 2022-12-05 PROCEDURE — 99214 OFFICE O/P EST MOD 30 MIN: CPT | Mod: 95

## 2022-12-05 RX ORDER — AMOXICILLIN AND CLAVULANATE POTASSIUM 875; 125 MG/1; MG/1
875-125 TABLET, COATED ORAL
Qty: 20 | Refills: 0 | Status: DISCONTINUED | COMMUNITY
Start: 2022-10-17 | End: 2022-12-05

## 2022-12-07 NOTE — ASSESSMENT
[FreeTextEntry1] : 56 y/o F w/ hx of weak +CCP=22 with pain in b/l hands around the MCPs and PIPs daily w/ swelling and stiffness with symptoms concerning for RA.\par Also notes aches in the b/l hands, elbows, shoulders, knees, ankles, toes intermittently.\par Notes some secondary Sjogren symptoms of dry eyes and dry mouth at times.\par -No synovitis or effusion on exam noted today on  mg daily at this time\par -reviewed labs 9/13/22 w/ CBC/CMP ok; TSH normal; vit D normal; 12/16/21 w/ CBC/CMP ok; urine w/ small blood (saw her urologist to monitors there from the past also w/ workup negative reported); 12/14/21 normal ESR/CRP; RO/LA negative \par -labs 9/30/2021 w/ normal ESR/CRP; CCP negative now on HCQ; RF negative; ROBERT w/ IF negative; CBC/CMP ok; vit D high (to cut down on dose); labs 10/30/2017 w/ +CCP=22: +ROBERT 1:80 homogenous \par -refill HCQ 200mg daily, closer to wt based w/ G6PD normal w/ prescription sent as below & had deferred higher dose as felt palpations then \par -labs as below to monitor \par -US b/l hands/wrists on HCQ from [facility] with tiny ganglion cyst in the Rt radial dorsal wrist without effusion or synovitis on HCQ\par -reports she saw Optoh, Dr. Dhaliwal about a year ago and told ok to continue HCQ and will f/u soon & alert us if any concerns; will ask if dry eyes noted again & reports hx of of dry eyes w/ prior Optho\par -dry eyes: refresh PRN and discuss w/ Optho; RO/LA negative; will check early Sjogren abs to be complete \par -dry mouth: discussed biotene gum; biotene mouthwash or spray or toothpaste PRN\par -xray b/l hands 10/12/2018 w/ mild b/l scaphotrapezium osteoarthropathy \par -xray c-spine 10/12/2018 w/ discogenic disease and arthrosis \par \par +ROBERT 1:80 homogenous: discussed likely being seen in the setting of RA and Negative ROBERT on repeat\par -Clinically without much symptoms of CTD/lupus at this time and educated on symptoms to monitor for in detail if she evolves.\par -disease activity markers were stable in 9/30/21 w/ DS-DNA neg; C3/C4 WNL; ROBERT negative \par \par Osteopenia: on Dexa 11/8/21\par -encouraged Ca+vitD supplementation\par -encouraged weight bearing exercises as tolerated.\par \par -educated on symptoms to monitor for in detail and alert us if any concerns.\par -knows to stay up to date on health maintenance w/ PCP\par -f/u in 2-3 mo w/ labs or sooner as needed \par . \par

## 2022-12-07 NOTE — HISTORY OF PRESENT ILLNESS
[FreeTextEntry1] : Verbal consent given for telehealth video visit on 12/5/22 by patient, via [facility] with visit done from my David lphone at [address] [place] [address] and patient at her home on her phone in [place].\par \par 56 y/o F for follow up w/ RA w/ hx of weak +CCP on Plaquenil states it is helping her joint aches in the hands/MCPs/PIPs/ elbows/knee/ankles/toes. States she is tolerating HCQ 200mg daily well and had deferred higher dose that caused palpation in the past. \par Today she has no swelling or redness or warmth of any joints on the  mg daily at this time.\par States eye exam on HCQ was ok w/ Optho, Dr. Dhaliwal about a year ago and will f/u for eye exam soon and alert us if any concerns. \par Has good ROM in b/l shoulders, no pelvic/girdle stiffness and able to stand up without using her hands, no temporal pain/unremitting headaches, no vision changes, no jaw pain.\par Denies any fever/chills, no rashes, no ulcers, + dry eyes was on Refresh w/ Optho, + dry mouth at times, no raynaud's, no GI/ symptoms at this time.\par

## 2023-01-09 ENCOUNTER — APPOINTMENT (OUTPATIENT)
Dept: FAMILY MEDICINE | Facility: CLINIC | Age: 58
End: 2023-01-09
Payer: COMMERCIAL

## 2023-01-09 VITALS
DIASTOLIC BLOOD PRESSURE: 82 MMHG | SYSTOLIC BLOOD PRESSURE: 152 MMHG | HEART RATE: 105 BPM | OXYGEN SATURATION: 98 % | BODY MASS INDEX: 20.66 KG/M2 | TEMPERATURE: 97 F | HEIGHT: 65 IN | WEIGHT: 124 LBS

## 2023-01-09 DIAGNOSIS — H60.90 UNSPECIFIED OTITIS EXTERNA, UNSPECIFIED EAR: ICD-10-CM

## 2023-01-09 PROCEDURE — 99213 OFFICE O/P EST LOW 20 MIN: CPT

## 2023-01-09 NOTE — HISTORY OF PRESENT ILLNESS
[FreeTextEntry8] : Patient presents with earache. Onset of symptoms began around 2 weeks ago: swollen lymph glands under both ears, left earache, and yellow mucus when she blows her nose. States that she had recently come back from visiting family in [place]; family was sick at the time and she believes she may have caught something from them. She had put hydrogen peroxide in her ear which had resulted in more pain and some ear drops from 2008; has not helped much. Denies any sinus pressure, any recent swimming/surfing, and chest congestion.

## 2023-01-09 NOTE — PLAN
[FreeTextEntry1] : Will prescribe ear drops at this time. Recommended taking Tylenol or Advil for pain and to apply a warm compress on lymph nodes to reduce inflammation.\par \par

## 2023-01-09 NOTE — PHYSICAL EXAM
[Grossly Normal Strength/Tone] : grossly normal strength/tone [No Rash] : no rash [No Focal Deficits] : no focal deficits [Normal] : affect was normal and insight and judgment were intact [de-identified] : dull eardrums, inflamed left ear canal,  congested nasal mucosa [de-identified] : swollen lymph nodes under left ear

## 2023-01-09 NOTE — HEALTH RISK ASSESSMENT
[0] : 2) Feeling down, depressed, or hopeless: Not at all (0) [PHQ-2 Negative - No further assessment needed] : PHQ-2 Negative - No further assessment needed [[id number]] : 0

## 2023-01-09 NOTE — ASSESSMENT
[FreeTextEntry1] : Her symptoms suggest otitis externa though her exam is fairly benign. Will treat based upon her symptoms.

## 2023-02-01 ENCOUNTER — APPOINTMENT (OUTPATIENT)
Dept: FAMILY MEDICINE | Facility: CLINIC | Age: 58
End: 2023-02-01
Payer: COMMERCIAL

## 2023-02-01 VITALS — SYSTOLIC BLOOD PRESSURE: 118 MMHG | DIASTOLIC BLOOD PRESSURE: 70 MMHG

## 2023-02-01 VITALS
SYSTOLIC BLOOD PRESSURE: 144 MMHG | HEIGHT: 65 IN | BODY MASS INDEX: 20.66 KG/M2 | WEIGHT: 124 LBS | OXYGEN SATURATION: 100 % | TEMPERATURE: 97.1 F | DIASTOLIC BLOOD PRESSURE: 96 MMHG | HEART RATE: 78 BPM

## 2023-02-01 PROCEDURE — 36415 COLL VENOUS BLD VENIPUNCTURE: CPT

## 2023-02-01 PROCEDURE — 99214 OFFICE O/P EST MOD 30 MIN: CPT | Mod: 25

## 2023-02-01 RX ORDER — AMLODIPINE BESYLATE 5 MG/1
5 TABLET ORAL
Refills: 0 | Status: DISCONTINUED | COMMUNITY
End: 2023-02-01

## 2023-02-01 RX ORDER — CIPROFLOXACIN AND DEXAMETHASONE 3; 1 MG/ML; MG/ML
0.3-0.1 SUSPENSION/ DROPS AURICULAR (OTIC) TWICE DAILY
Qty: 1 | Refills: 0 | Status: DISCONTINUED | COMMUNITY
Start: 2023-01-09 | End: 2023-02-01

## 2023-02-01 RX ORDER — BERBERINE CHLOR/SEAWEED/CHROM 500-250 MG
CAPSULE ORAL
Refills: 0 | Status: ACTIVE | COMMUNITY

## 2023-02-01 NOTE — PHYSICAL EXAM
[Normal Outer Ear/Nose] : the outer ears and nose were normal in appearance [No Edema] : there was no peripheral edema [Normal] : no rash [Coordination Grossly Intact] : coordination grossly intact [No Focal Deficits] : no focal deficits [Normal Gait] : normal gait [Normal Affect] : the affect was normal [Normal Insight/Judgement] : insight and judgment were intact [de-identified] : large effusion left TM with blunted light reflex, no erythema; sinus congestion more on the left maxillary sinus than the right; PND, no exudates/erythema.

## 2023-02-01 NOTE — HISTORY OF PRESENT ILLNESS
[FreeTextEntry1] : Follow up HTN, HLD, prediabetes. [de-identified] : Patient is a 57yo female with PMH RA, HTN, HLD, prediabetes, anxiety who presents to the office for follow up. \par \par HTN:  pt currently taking Losartan 50mg daily (changed from Amlodipine 5mg daily).  Pt does check BP at home, usually around 118/70's.  Pt admits to white coat syndrome, typically has elevated BP in doctor's offices.  Pt denies HA, dizziness, vision changes, CP, SOB, LE edema. \par Cardiology:  Dr. Mattson. \par \par RA:  follows with Rheumatology, Dr. Maciel.  Pt taking Plaquenil.  \par Pt has b/w ordered by Dr. Maciel, requests we draw these labs in our office as well.\par  \par B/w at [facility] in 09/2022 revealed A1C 5.8%, fasting glucose 102, , , total cholesterol 295. \par Diet:  well balanced, healthy.  Limiting peanut butter, thompson, etc.\par Exercise:  walking, 5x/wk, weight training.\par Cardiology:  Dr. Srinivasan.\par \par Pt states she has persistent left ear pain, sinus pressure/congestion on the left side.\par States has been using X-clear with Xylitol and Cipro-Dex without significant improvement.\par Denies fevers.

## 2023-02-01 NOTE — ASSESSMENT
[FreeTextEntry1] : Patient is a 56yo female with PMH RA, HTN, HLD, prediabetes who presents to the office for CPE. \par \par HTN\par - BP slightly elevated in office today, admits to white coat syndrome.\par - Home BP has been stable/at goal.\par - Continue Losartan 50mg daily.\par - Monitor blood pressure at home, keep log, alert office if too high/too low.\par - DASH diet encouraged, regular exercise encouraged.\par - Alert office if you develop any new, worsening or concerning symptoms including headache, vision changes, dizziness, loss of consciousness, chest pain, shortness of breath, or lower extremity edema.\par \par HLD, Prediabetes\par - Fasting labs drawn in office.\par - Limit/avoid greasy foods, fried foods, fatty foods, and saturated fat in your diet and try and eat more lean proteins.\par - Limit/avoid white flour-based carbohydrates (white bread, white rice, pasta, cake, cookies, etc.) and limit added sugar in your diet (sugary drinks, sugar in coffee/tea, alcohol, candy, etc.).\par \par RA\par - Labs ordered per Dr. Maciel, will share results with Dr. Maciel once received.\par - Pt has f/u with Dr. Maciel on 2/8/23.\par \par Sinusitis\par - Pt encouraged to try Flonase for the next several days.\par - If no improvement/worsening symptoms, start Augmentin BID x10 days with food.\par - Supportive care discussed.\par \par Call the office or go to the ED immediately if you develop new, worsening or concerning symptoms including high fever, severe headache/worst headache of your life, confusion, dizziness/lightheadedness, loss of consciousness, severe chest pain, difficulty breathing, shortness of breath, severe abdominal pain, excessive vomiting/diarrhea, inability to feel/move the extremities, or any other concerning symptoms.\par

## 2023-02-01 NOTE — PLAN
[FreeTextEntry1] : See assessment.\par \par The following OTC medications are recommended to treat URI/cold symptoms. Patient may use any or all of the following, as clinically indicated by symptoms, as long as not contraindicated by allergy or other medical conditions. \par \par Neti-pot/nasal saline spray- ¼ tsp salt dissolved in warm water in Neti Pot \par Umcka (pelargonium)- treats cough/cold symptoms \par Sambucol (black elderberry syrup)- boosts immune system \par Nasal Steroid Spray (e.g. Fluticasone)- decreases nasal/sinus congestion; okay to use for weeks or months at a time \par Nasal decongestant spray (e.g. Afrin)- decreases nasal/sinus congestion but can only use short term (a few days at most) or else have side effects \par Decongestant (e.g. Sudafed)- decreases nasal/sinus congestion \par Antihistamine (e.g. Benadryl, Claritin etc.)- decreases runny nose and post nasal drip- NOT for use during acute sinus infections \par Mucolytic (e.g. Mucinex)- thins mucous to help it drain more easily \par Tylenol- for fever, pain, headache, aches etc. \par Ibuprofen (e.g. Advil, Motrin) or Naproxen (e.g. Aleve)- for fever, pain, headache, aches \par Fluids- thins mucous, keeps you hydrated \par Zinc- reduces duration and severity of cold symptoms \par Honey- 1 tsp. to 1 Tbsp. straight off spoon or mixed with tea or hot water- soothes sore throat and quiets cough.\par

## 2023-02-01 NOTE — REVIEW OF SYSTEMS
[Earache] : earache [Nasal Discharge] : nasal discharge [Postnasal Drip] : postnasal drip [Negative] : Heme/Lymph [Sore Throat] : no sore throat

## 2023-02-01 NOTE — HEALTH RISK ASSESSMENT
[Yes] : Yes [2 - 3 times a week (3 pts)] : 2 - 3  times a week (3 points) [1 or 2 (0 pts)] : 1 or 2 (0 points) [Never (0 pts)] : Never (0 points) [No] : In the past 12 months have you used drugs other than those required for medical reasons? No [No falls in past year] : Patient reported no falls in the past year [0] : 2) Feeling down, depressed, or hopeless: Not at all (0) [PHQ-2 Negative - No further assessment needed] : PHQ-2 Negative - No further assessment needed [Never] : Never [Audit-CScore] : 3 [de-identified] : active, walks 5x/wk, weight lifting [de-identified] : well balanced, healthy [TWB2Luusv] : 0

## 2023-02-02 LAB
ALBUMIN SERPL ELPH-MCNC: 4.7 G/DL
ALP BLD-CCNC: 68 U/L
ALT SERPL-CCNC: 21 U/L
ANION GAP SERPL CALC-SCNC: 12 MMOL/L
AST SERPL-CCNC: 24 U/L
BILIRUB SERPL-MCNC: 0.5 MG/DL
BUN SERPL-MCNC: 14 MG/DL
CALCIUM SERPL-MCNC: 9.7 MG/DL
CHLORIDE SERPL-SCNC: 103 MMOL/L
CHOLEST SERPL-MCNC: 276 MG/DL
CO2 SERPL-SCNC: 26 MMOL/L
CREAT SERPL-MCNC: 0.77 MG/DL
CRP SERPL-MCNC: <3 MG/L
EGFR: 89 ML/MIN/1.73M2
ERYTHROCYTE [SEDIMENTATION RATE] IN BLOOD BY WESTERGREN METHOD: 8 MM/HR
ESTIMATED AVERAGE GLUCOSE: 114 MG/DL
GLUCOSE SERPL-MCNC: 105 MG/DL
HBA1C MFR BLD HPLC: 5.6 %
HDLC SERPL-MCNC: 81 MG/DL
LDLC SERPL CALC-MCNC: 171 MG/DL
NONHDLC SERPL-MCNC: 195 MG/DL
POTASSIUM SERPL-SCNC: 4.2 MMOL/L
PROT SERPL-MCNC: 7.3 G/DL
SODIUM SERPL-SCNC: 141 MMOL/L
TRIGL SERPL-MCNC: 124 MG/DL

## 2023-02-06 LAB — ANACR T: NEGATIVE

## 2023-02-08 ENCOUNTER — APPOINTMENT (OUTPATIENT)
Dept: RHEUMATOLOGY | Facility: CLINIC | Age: 58
End: 2023-02-08

## 2023-02-09 ENCOUNTER — NON-APPOINTMENT (OUTPATIENT)
Age: 58
End: 2023-02-09

## 2023-02-15 ENCOUNTER — NON-APPOINTMENT (OUTPATIENT)
Age: 58
End: 2023-02-15

## 2023-02-15 LAB
CA VI IGA AB: 2.9 EU/ML
CA VI IGG AB: 44.2 EU/ML
CA VI IGM AB: 5.4 EU/ML
PSP IGA AB: NORMAL
PSP IGG AB: NORMAL
PSP IGM AB: NORMAL
SEROLOGY COMMENTS: NORMAL
SP-1 IGA AB: NORMAL
SP-1 IGG AB: 2.3 EU/ML
SP-1 IGM AB: NORMAL

## 2023-03-06 ENCOUNTER — APPOINTMENT (OUTPATIENT)
Dept: OBGYN | Facility: CLINIC | Age: 58
End: 2023-03-06
Payer: COMMERCIAL

## 2023-03-06 VITALS
HEIGHT: 65 IN | DIASTOLIC BLOOD PRESSURE: 80 MMHG | SYSTOLIC BLOOD PRESSURE: 130 MMHG | BODY MASS INDEX: 20.33 KG/M2 | WEIGHT: 122 LBS

## 2023-03-06 DIAGNOSIS — N39.46 MIXED INCONTINENCE: ICD-10-CM

## 2023-03-06 DIAGNOSIS — N89.8 OTHER SPECIFIED NONINFLAMMATORY DISORDERS OF VAGINA: ICD-10-CM

## 2023-03-06 DIAGNOSIS — Z12.11 ENCOUNTER FOR SCREENING FOR MALIGNANT NEOPLASM OF COLON: ICD-10-CM

## 2023-03-06 PROCEDURE — 99396 PREV VISIT EST AGE 40-64: CPT

## 2023-03-06 PROCEDURE — 82270 OCCULT BLOOD FECES: CPT

## 2023-03-06 NOTE — PHYSICAL EXAM
[Chaperone Declined] : Patient declined chaperone [Appropriately responsive] : appropriately responsive [Alert] : alert [No Acute Distress] : no acute distress [No Lymphadenopathy] : no lymphadenopathy [Soft] : soft [Non-tender] : non-tender [Non-distended] : non-distended [No HSM] : No HSM [No Lesions] : no lesions [No Mass] : no mass [Oriented x3] : oriented x3 [Examination Of The Breasts] : a normal appearance [No Masses] : no breast masses were palpable [Labia Majora] : normal [Labia Minora] : normal [Normal] : normal [Uterine Adnexae] : normal [No Tenderness] : no tenderness [Nl Sphincter Tone] : normal sphincter tone [FreeTextEntry9] : -mass-guiac

## 2023-03-06 NOTE — HISTORY OF PRESENT ILLNESS
[FreeTextEntry1] : 59 yo , some mixed incontinance, no  bleeding , no hot flashes, somse vag dryness. Uses hyuronic acid suppository [Mammogramdate] : 2022 [BreastSonogramDate] : 2022 [PapSmeardate] : 2022 [TextBox_37] : rhematologist does due dec [TextBox_43] : yrs ago was given 10

## 2023-03-31 ENCOUNTER — APPOINTMENT (OUTPATIENT)
Dept: OTOLARYNGOLOGY | Facility: CLINIC | Age: 58
End: 2023-03-31

## 2023-04-04 ENCOUNTER — APPOINTMENT (OUTPATIENT)
Dept: OTOLARYNGOLOGY | Facility: CLINIC | Age: 58
End: 2023-04-04
Payer: COMMERCIAL

## 2023-04-04 VITALS — HEIGHT: 65 IN | TEMPERATURE: 96.5 F | BODY MASS INDEX: 20.49 KG/M2 | WEIGHT: 123 LBS

## 2023-04-04 PROCEDURE — 99203 OFFICE O/P NEW LOW 30 MIN: CPT

## 2023-04-04 NOTE — REVIEW OF SYSTEMS
[Seasonal Allergies] : seasonal allergies [Ear Pain] : ear pain [Vertigo] : vertigo [Negative] : Heme/Lymph [Patient Intake Form Reviewed] : Patient intake form was reviewed

## 2023-04-05 NOTE — HISTORY OF PRESENT ILLNESS
[de-identified] : co several mo as pain and pressure\par not popping, no hearing loss\par started otic gtts not helping than po antibiotic\par hx tmj and using , rheu arthritis\par Plaquenil\par

## 2023-04-05 NOTE — PHYSICAL EXAM
[Midline] : trachea located in midline position [de-identified] : click and tenderness [Normal] : tympanic membranes are normal in both ears [de-identified] : cerumen left

## 2023-04-05 NOTE — ASSESSMENT
[FreeTextEntry1] : cerumen cleared as\par tmj tenderness\par mild left ot externa\par ciprodex\par fu dentist re tmj

## 2023-04-05 NOTE — PROCEDURE
[Cerumen Impaction] : Cerumen Impaction [FreeTextEntry6] : Indication: ear discomfort and plugging\par Large amount cerumen cleared left ear instrumentation with curettes, forceps and suction.\par Ear canals and tympanic membranes  unremarkable.\par canal mild erythema

## 2023-04-17 ENCOUNTER — APPOINTMENT (OUTPATIENT)
Dept: FAMILY MEDICINE | Facility: CLINIC | Age: 58
End: 2023-04-17
Payer: COMMERCIAL

## 2023-04-17 VITALS
HEIGHT: 65 IN | TEMPERATURE: 96.5 F | DIASTOLIC BLOOD PRESSURE: 82 MMHG | HEART RATE: 78 BPM | WEIGHT: 125 LBS | SYSTOLIC BLOOD PRESSURE: 148 MMHG | BODY MASS INDEX: 20.83 KG/M2 | OXYGEN SATURATION: 98 %

## 2023-04-17 VITALS — SYSTOLIC BLOOD PRESSURE: 138 MMHG | DIASTOLIC BLOOD PRESSURE: 78 MMHG

## 2023-04-17 PROCEDURE — 99214 OFFICE O/P EST MOD 30 MIN: CPT

## 2023-04-17 RX ORDER — AMOXICILLIN AND CLAVULANATE POTASSIUM 875; 125 MG/1; MG/1
875-125 TABLET, COATED ORAL
Qty: 20 | Refills: 0 | Status: DISCONTINUED | COMMUNITY
Start: 2023-02-01 | End: 2023-04-17

## 2023-04-17 NOTE — HISTORY OF PRESENT ILLNESS
[FreeTextEntry1] : Follow up HTN, RA. [de-identified] : Patient is a 57yo female with PMH RA, HTN, HLD, prediabetes, anxiety who presents to the office for follow up.  \par \par HTN: pt currently taking Losartan 50mg daily. Pt does check BP at home, typically at goal.  Pt admits to white coat syndrome, typically has elevated BP in doctor's offices. Pt denies HA, dizziness, vision changes, CP, SOB, LE edema.  \par Cardiology: Dr. Mattson.  \par \par RA: follows with Rheumatology, Dr. Maciel. Pt taking Plaquenil.  \par Pt has appointment scheduled with Dr. Maciel for follow up on 5/25/23. \par \par B/w in 02/2022 showed improved TG, however worsening LDL.  ASCVD risk remains low.  Recommended fasting RPA in 05/2023 and pt has appointment.

## 2023-04-17 NOTE — HEALTH RISK ASSESSMENT
[Yes] : Yes [2 - 3 times a week (3 pts)] : 2 - 3  times a week (3 points) [1 or 2 (0 pts)] : 1 or 2 (0 points) [Never (0 pts)] : Never (0 points) [No] : In the past 12 months have you used drugs other than those required for medical reasons? No [No falls in past year] : Patient reported no falls in the past year [0] : 2) Feeling down, depressed, or hopeless: Not at all (0) [PHQ-2 Negative - No further assessment needed] : PHQ-2 Negative - No further assessment needed [Audit-CScore] : 3 [de-identified] : walks regularly, light weight training [de-identified] : well balanced [[id number]] : 0 [Never] : Never

## 2023-04-17 NOTE — ASSESSMENT
[FreeTextEntry1] : Patient is a 59yo female with PMH RA, HTN, HLD, prediabetes, anxiety who presents to the office for follow up. \par \par Pt requires disability paperwork to be filled out.  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  Pt has extreme fatigue from RA and Hydroxychloroquine she takes to manage her disease.  Paperwork completed.\par \par HTN\par - BP slightly elevated in office, has hx white coat syndrome, states BP at home has been at goal.\par - Continue Losartan 50mg daily.\par - Monitor blood pressure at home, keep log, alert office if too high/too low.\par - DASH diet encouraged, regular exercise encouraged.\par - Alert office if you develop any new, worsening or concerning symptoms including headache, vision changes, dizziness, loss of consciousness, chest pain, shortness of breath, or lower extremity edema.\par \par Pt has f/u scheduled next month for repeat fasting labs to trend cholesterol.

## 2023-04-17 NOTE — PHYSICAL EXAM
[Normal TMs] : both tympanic membranes were normal [No Edema] : there was no peripheral edema [Normal] : no rash [Coordination Grossly Intact] : coordination grossly intact [No Focal Deficits] : no focal deficits [Normal Gait] : normal gait [Normal Affect] : the affect was normal [Normal Insight/Judgement] : insight and judgment were intact

## 2023-04-20 ENCOUNTER — APPOINTMENT (OUTPATIENT)
Dept: ORTHOPEDIC SURGERY | Facility: CLINIC | Age: 58
End: 2023-04-20

## 2023-05-03 ENCOUNTER — RX RENEWAL (OUTPATIENT)
Age: 58
End: 2023-05-03

## 2023-05-04 RX ORDER — OLOPATADINE HCL 1 MG/ML
0.1 SOLUTION/ DROPS OPHTHALMIC TWICE DAILY
Qty: 1 | Refills: 6 | Status: ACTIVE | COMMUNITY
Start: 2023-05-04 | End: 1900-01-01

## 2023-05-08 ENCOUNTER — APPOINTMENT (OUTPATIENT)
Dept: FAMILY MEDICINE | Facility: CLINIC | Age: 58
End: 2023-05-08
Payer: COMMERCIAL

## 2023-05-08 VITALS
HEIGHT: 65 IN | OXYGEN SATURATION: 98 % | WEIGHT: 123 LBS | TEMPERATURE: 97.6 F | HEART RATE: 80 BPM | BODY MASS INDEX: 20.49 KG/M2 | DIASTOLIC BLOOD PRESSURE: 76 MMHG | SYSTOLIC BLOOD PRESSURE: 134 MMHG

## 2023-05-08 PROCEDURE — 99214 OFFICE O/P EST MOD 30 MIN: CPT | Mod: 25

## 2023-05-08 PROCEDURE — 36415 COLL VENOUS BLD VENIPUNCTURE: CPT

## 2023-05-08 RX ORDER — CIPROFLOXACIN AND DEXAMETHASONE 3; 1 MG/ML; MG/ML
0.3-0.1 SUSPENSION/ DROPS AURICULAR (OTIC)
Refills: 0 | Status: DISCONTINUED | COMMUNITY
End: 2023-05-08

## 2023-05-08 RX ORDER — ESTRADIOL 10 UG/1
10 TABLET, FILM COATED VAGINAL
Qty: 24 | Refills: 2 | Status: DISCONTINUED | COMMUNITY
Start: 2022-07-28 | End: 2023-05-08

## 2023-05-08 NOTE — ASSESSMENT
[FreeTextEntry1] : Patient is a 59yo female with PMH RA, HTN, HLD, prediabetes, anxiety who presents to the office for follow up. \par \par HTN\par - BP well controlled on current regimen.\par - Continue Losartan 50mg daily.  Will be filling this RX for pt as pt no longer follows with Cardiology on a regular basis (had normal w/u with Dr. Srinivasan).  RF provided today.\par - Monitor blood pressure at home, keep log, alert office if too high/too low.\par - DASH diet encouraged, regular exercise encouraged.\par - Alert office if you develop any new, worsening or concerning symptoms including headache, vision changes, dizziness, loss of consciousness, chest pain, shortness of breath, or lower extremity edema.\par \par HLD\par - Fasting labs drawn in office.\par - Limit/avoid greasy foods, fried foods, fatty foods, and saturated fat in your diet and try and eat more lean proteins.\par \par Prediabetes\par - Fasting labs drawn in office.\par - Limit/avoid white flour-based carbohydrates (white bread, white rice, pasta, cake, cookies, etc.) and limit added sugar in your diet (sugary drinks, sugar in coffee/tea, alcohol, candy, etc.).\par \par Call the office or go to the ED immediately if you develop new, worsening or concerning symptoms including high fever, severe headache/worst headache of your life, confusion, dizziness/lightheadedness, loss of consciousness, severe chest pain, difficulty breathing, shortness of breath, severe abdominal pain, excessive vomiting/diarrhea, inability to feel/move the extremities, or any other concerning symptoms.\par

## 2023-05-08 NOTE — HEALTH RISK ASSESSMENT
[No falls in past year] : Patient reported no falls in the past year [Yes] : Yes [2 - 3 times a week (3 pts)] : 2 - 3  times a week (3 points) [1 or 2 (0 pts)] : 1 or 2 (0 points) [Never (0 pts)] : Never (0 points) [No] : In the past 12 months have you used drugs other than those required for medical reasons? No [0] : 2) Feeling down, depressed, or hopeless: Not at all (0) [PHQ-2 Negative - No further assessment needed] : PHQ-2 Negative - No further assessment needed [Audit-CScore] : 3 [de-identified] : [place] [de-identified] : well balanced, has improved diet [[id number]] : 0 [Never] : Never

## 2023-05-09 LAB
ALBUMIN SERPL ELPH-MCNC: 4.7 G/DL
ALP BLD-CCNC: 68 U/L
ALT SERPL-CCNC: 20 U/L
ANION GAP SERPL CALC-SCNC: 10 MMOL/L
AST SERPL-CCNC: 27 U/L
BILIRUB SERPL-MCNC: 0.5 MG/DL
BUN SERPL-MCNC: 12 MG/DL
CALCIUM SERPL-MCNC: 9.8 MG/DL
CHLORIDE SERPL-SCNC: 103 MMOL/L
CHOLEST SERPL-MCNC: 265 MG/DL
CO2 SERPL-SCNC: 26 MMOL/L
CREAT SERPL-MCNC: 0.75 MG/DL
EGFR: 92 ML/MIN/1.73M2
ESTIMATED AVERAGE GLUCOSE: 114 MG/DL
GLUCOSE SERPL-MCNC: 99 MG/DL
HBA1C MFR BLD HPLC: 5.6 %
HDLC SERPL-MCNC: 87 MG/DL
LDLC SERPL CALC-MCNC: 153 MG/DL
NONHDLC SERPL-MCNC: 177 MG/DL
POTASSIUM SERPL-SCNC: 4.4 MMOL/L
PROT SERPL-MCNC: 7.1 G/DL
SODIUM SERPL-SCNC: 140 MMOL/L
TRIGL SERPL-MCNC: 121 MG/DL

## 2023-05-19 ENCOUNTER — NON-APPOINTMENT (OUTPATIENT)
Age: 58
End: 2023-05-19

## 2023-05-25 ENCOUNTER — APPOINTMENT (OUTPATIENT)
Dept: RHEUMATOLOGY | Facility: CLINIC | Age: 58
End: 2023-05-25

## 2023-05-31 ENCOUNTER — RX RENEWAL (OUTPATIENT)
Age: 58
End: 2023-05-31

## 2023-06-08 ENCOUNTER — APPOINTMENT (OUTPATIENT)
Dept: RHEUMATOLOGY | Facility: CLINIC | Age: 58
End: 2023-06-08

## 2023-06-16 ENCOUNTER — NON-APPOINTMENT (OUTPATIENT)
Age: 58
End: 2023-06-16

## 2023-06-22 ENCOUNTER — RX RENEWAL (OUTPATIENT)
Age: 58
End: 2023-06-22

## 2023-06-26 ENCOUNTER — RX RENEWAL (OUTPATIENT)
Age: 58
End: 2023-06-26

## 2023-06-28 ENCOUNTER — APPOINTMENT (OUTPATIENT)
Dept: RHEUMATOLOGY | Facility: CLINIC | Age: 58
End: 2023-06-28
Payer: COMMERCIAL

## 2023-06-28 DIAGNOSIS — M35.00 SICCA SYNDROME, UNSPECIFIED: ICD-10-CM

## 2023-06-28 PROCEDURE — 99214 OFFICE O/P EST MOD 30 MIN: CPT | Mod: 95

## 2023-06-28 NOTE — HISTORY OF PRESENT ILLNESS
[FreeTextEntry1] : Verbal consent given for telehealth video visit on 6/28/23 by patient, via [facility] with visit done from my David lphone at [address] [place] [address] and patient at her home on her phone in [place].\par \par 57 y/o F for follow up w/ RA w/ hx of weak +CCP on Plaquenil states it is helping her joint aches in the hands/MCPs/PIPs/ elbows/knee/ankles/toes. States she is tolerating  mg daily well and had deferred higher dose that caused palpation in the past. \par Today she has no swelling or redness or warmth of any joints on the  mg daily at this time.\par She states she wants to continue the HCQ because without it she feels more joint pain. \par States eye exam on HCQ was ok w/ Optho, Dr. Dhaliwal 12/2022 and will f/u for eye exam soon and alert us if any concerns. \par Has good ROM in b/l shoulders, no pelvic/girdle stiffness and able to stand up without using her hands, no temporal pain/unremitting headaches, no vision changes, no jaw pain.\par Denies any fever/chills, no rashes, no ulcers, + dry eyes was on Refresh w/ Optho, + dry mouth at times, no raynaud's, no GI/ symptoms at this time.\par \par

## 2023-06-28 NOTE — ASSESSMENT
[FreeTextEntry1] : 59 y/o F w/ hx of weak +CCP=22 with pain in b/l hands around the MCPs and PIPs daily w/ swelling and stiffness with symptoms concerning for RA.\par Also notes aches in the b/l hands, elbows, shoulders, knees, ankles, toes intermittently.\par Notes some secondary RA symptoms of dry eyes and dry mouth at times.\par -No synovitis or effusion on exam noted today on  mg daily at this time\par -states she wants to continue the HCQ because without it she feels more joint pain \par -reviewed labs 5/8/23 CMP ok; 2/1/23 ESR normal; CRP normal; ROBERT negative; early Sjogren abs negative; 9/13/22 w/ CBC/CMP ok; TSH normal; vit D normal; 12/16/21 w/ CBC/CMP ok; urine w/ small blood (saw her urologist to monitors there from the past also w/ workup negative reported); 12/14/21 normal ESR/CRP; RO/LA negative \par -labs 9/30/2021 w/ normal ESR/CRP; CCP negative now on HCQ; RF negative; ROBERT w/ IF negative; CBC/CMP ok; vit D high (to cut down on dose); labs 10/30/2017 w/ +CCP=22: +ROBERT 1:80 homogenous \par -discussed r/b/s of refilling  mg daily, closer to wt based w/ G6PD normal w/ prescription sent as below & had deferred higher dose as felt palpations then \par -labs as below to monitor \par -US b/l hands/wrists on HCQ from [facility] radiology with tiny ganglion cyst in the Rt radial dorsal wrist without effusion or synovitis on HCQ\par -reports she saw Dr. Madhuri Jett 12/2022 and told ok to continue HCQ and will f/u soon & alert us if any concerns; will ask if dry eyes noted again & reports hx of of dry eyes w/ prior Optho\par -dry eyes: refresh PRN and discuss w/ Optho; RO/LA negative; early Sjogren abs Negative; discussed can be secondary symptoms of RA \par -dry mouth: discussed biotene lozenges prescribed; biotene mouthwash or spray or toothpaste PRN\par -xray b/l hands 10/12/2018 w/ mild b/l scaphotrapezium osteoarthropathy \par -xray c-spine 10/12/2018 w/ discogenic disease and arthrosis \par \par +ROBERT 1:80 homogenous: discussed likely being seen in the setting of RA and Negative ROBERT on repeat 2/1/23\par -Clinically without much symptoms of CTD/lupus at this time and educated on symptoms to monitor for in detail if she evolves.\par -disease activity markers were stable in 9/30/21 w/ DS-DNA neg; C3/C4 WNL; ROBERT negative \par \par Osteopenia: on Dexa 11/8/21\par -encouraged Ca+vitD supplementation\par -encouraged weight bearing exercises as tolerated.\par \par -educated on symptoms to monitor for in detail and alert us if any concerns.\par -knows to stay up to date on health maintenance w/ PCP\par -f/u in 6 weeks w/ labs or sooner as needed \par . \par  \par

## 2023-08-25 ENCOUNTER — APPOINTMENT (OUTPATIENT)
Dept: FAMILY MEDICINE | Facility: CLINIC | Age: 58
End: 2023-08-25
Payer: COMMERCIAL

## 2023-08-25 VITALS
HEART RATE: 66 BPM | SYSTOLIC BLOOD PRESSURE: 120 MMHG | DIASTOLIC BLOOD PRESSURE: 78 MMHG | TEMPERATURE: 96 F | OXYGEN SATURATION: 99 % | WEIGHT: 124 LBS | HEIGHT: 65 IN | BODY MASS INDEX: 20.66 KG/M2

## 2023-08-25 DIAGNOSIS — L30.9 DERMATITIS, UNSPECIFIED: ICD-10-CM

## 2023-08-25 PROCEDURE — 99213 OFFICE O/P EST LOW 20 MIN: CPT

## 2023-08-25 RX ORDER — TRIAMCINOLONE ACETONIDE 5 MG/G
0.5 CREAM TOPICAL 3 TIMES DAILY
Qty: 1 | Refills: 0 | Status: ACTIVE | COMMUNITY
Start: 2023-08-25 | End: 1900-01-01

## 2023-08-25 NOTE — ASSESSMENT
[FreeTextEntry1] : Pt is a 59yo female who presents to the office complaining of rash over the torso, started Monday [place] the [place].  Rash is not erythematous.  Lesions c/w insect bites/dermatitis, possibly from the sea as they are located where her bathing suit was.   RX for Triamcinolone topical provided.  Alert the office or go to the ED if you develop any new, worsening or concerning symptoms including high fever, increased redness surrounding the area, redness streaking up the extremity, excessive bleeding/drainage, severe pain, or any other concerning symptoms.

## 2023-08-25 NOTE — PHYSICAL EXAM
[Normal] : normal rate, regular rhythm, normal S1 and S2 and no murmur heard [No Edema] : there was no peripheral edema [Coordination Grossly Intact] : coordination grossly intact [No Focal Deficits] : no focal deficits [Normal Gait] : normal gait [Normal Affect] : the affect was normal [Normal Insight/Judgement] : insight and judgment were intact [de-identified] : multiple erythematous lesions on the torso c/w insect bites, no surrounding erythema or pustules/vesicles; does not follow dermatomal pattern

## 2023-08-25 NOTE — HISTORY OF PRESENT ILLNESS
[FreeTextEntry8] : Pt is a 57yo female who presents to the office complaining of rash. Rash started on the left cheek on Monday, applied hydrocortisone with improvement. States over the past 2-3 days has developed more spots on the abdomen and torso. Rash is not itchy or painful. Pt states she [place] after rain, there was a notice at the beach that bacteria levels may be high.  Pt was wearing a once piece bathing suit.

## 2023-08-28 ENCOUNTER — APPOINTMENT (OUTPATIENT)
Dept: FAMILY MEDICINE | Facility: CLINIC | Age: 58
End: 2023-08-28

## 2023-08-30 RX ORDER — HYDROCORTISONE 25 MG/G
2.5 CREAM TOPICAL 3 TIMES DAILY
Qty: 1 | Refills: 1 | Status: ACTIVE | COMMUNITY
Start: 2023-08-30 | End: 1900-01-01

## 2023-09-14 ENCOUNTER — NON-APPOINTMENT (OUTPATIENT)
Age: 58
End: 2023-09-14

## 2023-09-14 ENCOUNTER — APPOINTMENT (OUTPATIENT)
Dept: FAMILY MEDICINE | Facility: CLINIC | Age: 58
End: 2023-09-14
Payer: COMMERCIAL

## 2023-09-14 ENCOUNTER — LABORATORY RESULT (OUTPATIENT)
Age: 58
End: 2023-09-14

## 2023-09-14 VITALS
WEIGHT: 122 LBS | DIASTOLIC BLOOD PRESSURE: 84 MMHG | HEIGHT: 64 IN | BODY MASS INDEX: 20.83 KG/M2 | SYSTOLIC BLOOD PRESSURE: 122 MMHG | OXYGEN SATURATION: 97 % | TEMPERATURE: 97 F | HEART RATE: 101 BPM

## 2023-09-14 DIAGNOSIS — R73.03 PREDIABETES.: ICD-10-CM

## 2023-09-14 DIAGNOSIS — Z00.00 ENCOUNTER FOR GENERAL ADULT MEDICAL EXAMINATION W/OUT ABNORMAL FINDINGS: ICD-10-CM

## 2023-09-14 PROCEDURE — 93000 ELECTROCARDIOGRAM COMPLETE: CPT | Mod: 59

## 2023-09-14 PROCEDURE — 36415 COLL VENOUS BLD VENIPUNCTURE: CPT

## 2023-09-14 PROCEDURE — 99396 PREV VISIT EST AGE 40-64: CPT | Mod: 25

## 2023-09-15 LAB
25(OH)D3 SERPL-MCNC: 42.7 NG/ML
ALBUMIN SERPL ELPH-MCNC: 4.7 G/DL
ALP BLD-CCNC: 64 U/L
ALT SERPL-CCNC: 21 U/L
ANION GAP SERPL CALC-SCNC: 10 MMOL/L
AST SERPL-CCNC: 25 U/L
BASOPHILS # BLD AUTO: 0.06 K/UL
BASOPHILS NFR BLD AUTO: 1.1 %
BILIRUB SERPL-MCNC: 0.5 MG/DL
BUN SERPL-MCNC: 15 MG/DL
CALCIUM SERPL-MCNC: 9.7 MG/DL
CHLORIDE SERPL-SCNC: 103 MMOL/L
CHOLEST SERPL-MCNC: 272 MG/DL
CO2 SERPL-SCNC: 28 MMOL/L
CREAT SERPL-MCNC: 0.8 MG/DL
CRP SERPL-MCNC: <3 MG/L
EGFR: 85 ML/MIN/1.73M2
EOSINOPHIL # BLD AUTO: 0.44 K/UL
EOSINOPHIL NFR BLD AUTO: 8.2 %
ERYTHROCYTE [SEDIMENTATION RATE] IN BLOOD BY WESTERGREN METHOD: 9 MM/HR
ESTIMATED AVERAGE GLUCOSE: 117 MG/DL
GLUCOSE SERPL-MCNC: 96 MG/DL
HBA1C MFR BLD HPLC: 5.7 %
HCT VFR BLD CALC: 43.2 %
HDLC SERPL-MCNC: 80 MG/DL
HGB BLD-MCNC: 13.9 G/DL
IMM GRANULOCYTES NFR BLD AUTO: 0.2 %
LDLC SERPL CALC-MCNC: 172 MG/DL
LYMPHOCYTES # BLD AUTO: 1.95 K/UL
LYMPHOCYTES NFR BLD AUTO: 36.4 %
MAN DIFF?: NORMAL
MCHC RBC-ENTMCNC: 29.8 PG
MCHC RBC-ENTMCNC: 32.2 GM/DL
MCV RBC AUTO: 92.5 FL
MONOCYTES # BLD AUTO: 0.42 K/UL
MONOCYTES NFR BLD AUTO: 7.8 %
NEUTROPHILS # BLD AUTO: 2.48 K/UL
NEUTROPHILS NFR BLD AUTO: 46.3 %
NONHDLC SERPL-MCNC: 191 MG/DL
PLATELET # BLD AUTO: 222 K/UL
POTASSIUM SERPL-SCNC: 4.7 MMOL/L
PROT SERPL-MCNC: 7.1 G/DL
RBC # BLD: 4.67 M/UL
RBC # FLD: 13.2 %
SODIUM SERPL-SCNC: 142 MMOL/L
TRIGL SERPL-MCNC: 111 MG/DL
TSH SERPL-ACNC: 3.25 UIU/ML
WBC # FLD AUTO: 5.36 K/UL

## 2023-10-23 ENCOUNTER — APPOINTMENT (OUTPATIENT)
Dept: OBGYN | Facility: CLINIC | Age: 58
End: 2023-10-23

## 2023-11-06 ENCOUNTER — APPOINTMENT (OUTPATIENT)
Dept: RHEUMATOLOGY | Facility: CLINIC | Age: 58
End: 2023-11-06
Payer: COMMERCIAL

## 2023-11-06 VITALS
TEMPERATURE: 96.2 F | SYSTOLIC BLOOD PRESSURE: 148 MMHG | HEIGHT: 64 IN | HEART RATE: 70 BPM | OXYGEN SATURATION: 98 % | WEIGHT: 125 LBS | BODY MASS INDEX: 21.34 KG/M2 | DIASTOLIC BLOOD PRESSURE: 89 MMHG

## 2023-11-06 PROCEDURE — 99214 OFFICE O/P EST MOD 30 MIN: CPT

## 2024-01-03 ENCOUNTER — TRANSCRIPTION ENCOUNTER (OUTPATIENT)
Age: 59
End: 2024-01-03

## 2024-02-02 ENCOUNTER — TRANSCRIPTION ENCOUNTER (OUTPATIENT)
Age: 59
End: 2024-02-02

## 2024-02-07 ENCOUNTER — APPOINTMENT (OUTPATIENT)
Dept: RHEUMATOLOGY | Facility: CLINIC | Age: 59
End: 2024-02-07

## 2024-02-13 NOTE — PHYSICAL EXAM
[facility] NEUROSCIENCE [facility] MEDICAL/EMERGENCY CENTER  NEUROLOGY CLINIC   [address]   [place] [address]   [phone] Office   [phone] Fax           Date:  24     Name:  MADDY CHINO  :  1977  MRN:  [id number]     PCP:  Josselin Couch APRN - NP    Maddy Chino is a 46 y.o. female who was seen by synchronous (real-time) audio-video technology on 2024 for Migraine    Subjective:   Since her last visit, she has not been able to get the Aimovig 140mg monthly for prevention of migraine. She indicates that the cost went up significantly ($650), which she cannot afford. As such her migraines are worse, occurring a couple of times a week versus 1 every couple of weeks when she is on it. She did get the Nurtec but since the migraines are more frequent, she does run out of this.  She did have a couple of episodes of tunnel vision followed by migraine that she wanted imaging to ensure that this was actually related to migraine and not something else.  It was a little unnerving as she has never had migraine aura before.    Continues to do better in terms of sleep on the Sonata.      Recap from last visit:  While the migraines are better, they are still occurring a few times a week. Will try to increase the Aimovig to 140mg monthly for effect. She will discuss getting a physical barrier with her employer to help dampen the blue flashing lights that she is exposed to from [place] the [place].     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 Will increase this dose to 20mg as needed.     Follow up in three months.     Current Outpatient Medications   Medication Sig    Erenumab-aooe (AIMOVIG) 140 MG/ML SOAJ Inject 140 mg into the skin every 30 days    Rimegepant Sulfate (NURTEC) 75 MG TBDP Take 75 mg by mouth every other day    aspirin 81 MG EC tablet Take by mouth daily     [No Edema] : there was no peripheral edema [Normal] : no rash [Coordination Grossly Intact] : coordination grossly intact [No Focal Deficits] : no focal deficits [Normal Gait] : normal gait [Normal Affect] : the affect was normal [Normal Insight/Judgement] : insight and judgment were intact

## 2024-02-19 ENCOUNTER — APPOINTMENT (OUTPATIENT)
Dept: FAMILY MEDICINE | Facility: CLINIC | Age: 59
End: 2024-02-19
Payer: COMMERCIAL

## 2024-02-19 VITALS
SYSTOLIC BLOOD PRESSURE: 122 MMHG | TEMPERATURE: 97.1 F | HEART RATE: 71 BPM | BODY MASS INDEX: 21.51 KG/M2 | WEIGHT: 126 LBS | HEIGHT: 64 IN | DIASTOLIC BLOOD PRESSURE: 72 MMHG | OXYGEN SATURATION: 100 %

## 2024-02-19 DIAGNOSIS — Z87.891 PERSONAL HISTORY OF NICOTINE DEPENDENCE: ICD-10-CM

## 2024-02-19 DIAGNOSIS — H10.10 ACUTE ATOPIC CONJUNCTIVITIS, UNSPECIFIED EYE: ICD-10-CM

## 2024-02-19 PROCEDURE — 99213 OFFICE O/P EST LOW 20 MIN: CPT

## 2024-02-19 RX ORDER — ALPRAZOLAM 0.25 MG/1
0.25 TABLET ORAL
Qty: 10 | Refills: 0 | Status: ACTIVE | COMMUNITY
Start: 2022-09-13 | End: 1900-01-01

## 2024-02-19 NOTE — PLAN
[FreeTextEntry1] : The majority of sinus infections are caused by viruses and respond to supportive care and do not require use of antibiotics. Recommend treating sinus symptoms with OTC medications including decongestants (Sudafed or Coricidin), mucolytics (Mucinex or Robitussin), nasal saline spray or Neti pot, and Tylenol or ibuprofen for pain and fever.   Also recommend use of nasal steroid sprays (i.e. Flonase). Afrin (OTC decongestant spray) used SPARINGLY (not for more than 2-3 days in a row) can help decrease nasal congestion and help sinuses to drain.    Due to duration of symptoms will prescribe antibiotics at this time as well.

## 2024-02-19 NOTE — HEALTH RISK ASSESSMENT
[0] : 2) Feeling down, depressed, or hopeless: Not at all (0) [PHQ-2 Negative - No further assessment needed] : PHQ-2 Negative - No further assessment needed [Former] : Former [20 or more] : 20 or more [> 15 Years] : > 15 Years [[id number]] : 0

## 2024-02-19 NOTE — PHYSICAL EXAM
[No Respiratory Distress] : no respiratory distress  [No Rash] : no rash [No Focal Deficits] : no focal deficits [Normal] : normal sclera/conjunctiva, pupils are equal, round and reactive to light and extraocular movements are intact [de-identified] : left TM is dull, right nostril is congested significantly more than left

## 2024-02-19 NOTE — HISTORY OF PRESENT ILLNESS
[FreeTextEntry8] : Patient presents with "pink eye." Symptoms started 2 days ago. The eye is red and her eye is glued shut in the morning. She does not have discharge during the day, only at night. She put eye drops in today (Clear Eyes) so her eye is not red today. She was diagnosed with COVID on 1/26. She then developed sinus symptoms this past weekend. She is taking Advil but no other medications for her symptoms. She has frontal and maxillary sinus pressure. She cannot take decongestants due to palpitations. She has Flonase but prefers XClear nasal spray.

## 2024-02-19 NOTE — ASSESSMENT
[FreeTextEntry1] : Her symptoms are suggestive of a sinus infection. The discharge in her eye when she wakes up is likely from her sinuses since she has no eye discharge during the day. Her eye is not red today but she put Clear Eyes drops in.   She is concerned that she is flying on Sunday and wants to know what she can take to avoid sinus pressure. Hopefully her sympotms will be improved by then but she may use Afrin if needed, since she has side effects from decongestants.  She requests a refill of Xanax which she takes PRN for flight anxiety.

## 2024-02-25 RX ORDER — AMOXICILLIN AND CLAVULANATE POTASSIUM 875; 125 MG/1; MG/1
875-125 TABLET, COATED ORAL
Qty: 20 | Refills: 0 | Status: COMPLETED | COMMUNITY
Start: 2024-02-19 | End: 2024-02-29

## 2024-03-13 ENCOUNTER — APPOINTMENT (OUTPATIENT)
Dept: FAMILY MEDICINE | Facility: CLINIC | Age: 59
End: 2024-03-13
Payer: COMMERCIAL

## 2024-03-13 VITALS
HEIGHT: 64 IN | WEIGHT: 125 LBS | DIASTOLIC BLOOD PRESSURE: 78 MMHG | OXYGEN SATURATION: 97 % | HEART RATE: 73 BPM | BODY MASS INDEX: 21.34 KG/M2 | TEMPERATURE: 97.8 F | SYSTOLIC BLOOD PRESSURE: 130 MMHG

## 2024-03-13 DIAGNOSIS — E55.9 VITAMIN D DEFICIENCY, UNSPECIFIED: ICD-10-CM

## 2024-03-13 DIAGNOSIS — I10 ESSENTIAL (PRIMARY) HYPERTENSION: ICD-10-CM

## 2024-03-13 DIAGNOSIS — R73.01 IMPAIRED FASTING GLUCOSE: ICD-10-CM

## 2024-03-13 DIAGNOSIS — E78.5 HYPERLIPIDEMIA, UNSPECIFIED: ICD-10-CM

## 2024-03-13 DIAGNOSIS — F41.9 ANXIETY DISORDER, UNSPECIFIED: ICD-10-CM

## 2024-03-13 PROCEDURE — 36415 COLL VENOUS BLD VENIPUNCTURE: CPT

## 2024-03-13 PROCEDURE — 99214 OFFICE O/P EST MOD 30 MIN: CPT

## 2024-03-13 NOTE — PHYSICAL EXAM
[No Edema] : there was no peripheral edema [Non Tender] : non-tender [Soft] : abdomen soft [No Focal Deficits] : no focal deficits [Normal] : affect was normal and insight and judgment were intact

## 2024-03-13 NOTE — HEALTH RISK ASSESSMENT
[0] : 2) Feeling down, depressed, or hopeless: Not at all (0) [PHQ-2 Negative - No further assessment needed] : PHQ-2 Negative - No further assessment needed [Former] : Former [> 15 Years] : > 15 Years [20 or more] : 20 or more [[id number]] : 0

## 2024-03-13 NOTE — PLAN
[FreeTextEntry1] : Continue all medications as prescribed. Check labs as above. Will adjust any medications based upon lab results.  Reviewed age-appropriate preventive screening tests with patient.  Discussed clean eating (eg Mediterranean style eating plan) and regular exercise/staying as physically active as possible.  Include balance exercises and strength training and core strengthening exercises for bone health and to decrease risk for falls.  Reviewed importance of good self care (e.g. meditation, yoga, adequate rest, regular exercise, magnesium, clean eating, etc.).  Follow up for next physical in 6 months.  Face-to-face time spent with patient, over half in discussion of the above diagnoses and treatment plan: 30 minutes.

## 2024-03-13 NOTE — ASSESSMENT
[FreeTextEntry1] : She is here to follow up on RA, HTN, HLD, prediabetes, anxiety, osteopenia, and vitamin D deficiency.  She has been compliant with diet and exercise and is here to repeat her labs.  Her blood pressure is well improved today.  She is exercising regularly and feels that her diet is improved. She has been gluten free since mid-January. She has no history of celiac disease but does have a history of IBS and feels better on her current diet.  She would like labs done for her rheumatologist today as well.

## 2024-03-13 NOTE — HISTORY OF PRESENT ILLNESS
[FreeTextEntry1] : DESHAUN LEONARDO is a 59 year old female here for a follow up visit. [de-identified] : She has a history of RA, HTN, HLD, prediabetes, anxiety, osteopenia, and vitamin D deficiency.  Her annual physical was in 9/2023 and her cholesterol was elevated with an LDL of 172. Her 10 year ASCVD risk was 3.22%, so a statin was not recommended. Her HgbA1c was 5.7 so she was advised to cut back on carbohydrates and follow up in 6 months.

## 2024-03-14 ENCOUNTER — TRANSCRIPTION ENCOUNTER (OUTPATIENT)
Age: 59
End: 2024-03-14

## 2024-03-14 LAB
ALBUMIN SERPL ELPH-MCNC: 4.7 G/DL
ALP BLD-CCNC: 66 U/L
ALT SERPL-CCNC: 22 U/L
ANION GAP SERPL CALC-SCNC: 11 MMOL/L
AST SERPL-CCNC: 25 U/L
BILIRUB SERPL-MCNC: 0.6 MG/DL
BUN SERPL-MCNC: 13 MG/DL
CALCIUM SERPL-MCNC: 9.6 MG/DL
CHLORIDE SERPL-SCNC: 101 MMOL/L
CHOLEST SERPL-MCNC: 263 MG/DL
CO2 SERPL-SCNC: 28 MMOL/L
CREAT SERPL-MCNC: 0.84 MG/DL
EGFR: 80 ML/MIN/1.73M2
ESTIMATED AVERAGE GLUCOSE: 111 MG/DL
GLUCOSE SERPL-MCNC: 105 MG/DL
HBA1C MFR BLD HPLC: 5.5 %
HDLC SERPL-MCNC: 80 MG/DL
LDLC SERPL CALC-MCNC: 154 MG/DL
NONHDLC SERPL-MCNC: 183 MG/DL
POTASSIUM SERPL-SCNC: 4.2 MMOL/L
PROT SERPL-MCNC: 7.3 G/DL
SODIUM SERPL-SCNC: 140 MMOL/L
TRIGL SERPL-MCNC: 167 MG/DL

## 2024-03-18 LAB
BASOPHILS # BLD AUTO: 0.07 K/UL
BASOPHILS NFR BLD AUTO: 1.3 %
CRP SERPL-MCNC: <3 MG/L
EOSINOPHIL # BLD AUTO: 0.31 K/UL
EOSINOPHIL NFR BLD AUTO: 5.6 %
ERYTHROCYTE [SEDIMENTATION RATE] IN BLOOD BY WESTERGREN METHOD: 10 MM/HR
HCT VFR BLD CALC: 43.8 %
HGB BLD-MCNC: 14.4 G/DL
IMM GRANULOCYTES NFR BLD AUTO: 0.2 %
LYMPHOCYTES # BLD AUTO: 2.32 K/UL
LYMPHOCYTES NFR BLD AUTO: 42 %
MAN DIFF?: NORMAL
MCHC RBC-ENTMCNC: 30.3 PG
MCHC RBC-ENTMCNC: 32.9 GM/DL
MCV RBC AUTO: 92.2 FL
MONOCYTES # BLD AUTO: 0.48 K/UL
MONOCYTES NFR BLD AUTO: 8.7 %
NEUTROPHILS # BLD AUTO: 2.34 K/UL
NEUTROPHILS NFR BLD AUTO: 42.2 %
PLATELET # BLD AUTO: 239 K/UL
RBC # BLD: 4.75 M/UL
RBC # FLD: 13.3 %
WBC # FLD AUTO: 5.53 K/UL

## 2024-03-19 ENCOUNTER — NON-APPOINTMENT (OUTPATIENT)
Age: 59
End: 2024-03-19

## 2024-03-19 LAB — ANACR T: NEGATIVE

## 2024-03-29 ENCOUNTER — TRANSCRIPTION ENCOUNTER (OUTPATIENT)
Age: 59
End: 2024-03-29

## 2024-04-01 ENCOUNTER — NON-APPOINTMENT (OUTPATIENT)
Age: 59
End: 2024-04-01

## 2024-04-01 ENCOUNTER — TRANSCRIPTION ENCOUNTER (OUTPATIENT)
Age: 59
End: 2024-04-01

## 2024-04-05 ENCOUNTER — APPOINTMENT (OUTPATIENT)
Dept: FAMILY MEDICINE | Facility: CLINIC | Age: 59
End: 2024-04-05

## 2024-04-05 ENCOUNTER — APPOINTMENT (OUTPATIENT)
Dept: FAMILY MEDICINE | Facility: CLINIC | Age: 59
End: 2024-04-05
Payer: COMMERCIAL

## 2024-04-05 VITALS
BODY MASS INDEX: 21.34 KG/M2 | SYSTOLIC BLOOD PRESSURE: 128 MMHG | TEMPERATURE: 97.3 F | WEIGHT: 125 LBS | DIASTOLIC BLOOD PRESSURE: 72 MMHG | OXYGEN SATURATION: 97 % | HEIGHT: 64 IN | HEART RATE: 86 BPM

## 2024-04-05 DIAGNOSIS — R21 RASH AND OTHER NONSPECIFIC SKIN ERUPTION: ICD-10-CM

## 2024-04-05 DIAGNOSIS — J01.00 ACUTE MAXILLARY SINUSITIS, UNSPECIFIED: ICD-10-CM

## 2024-04-05 DIAGNOSIS — R05.9 COUGH, UNSPECIFIED: ICD-10-CM

## 2024-04-05 PROCEDURE — 99213 OFFICE O/P EST LOW 20 MIN: CPT

## 2024-04-05 NOTE — PLAN
[FreeTextEntry1] : The majority of sinus infections are caused by viruses and respond to supportive care and do not require use of antibiotics. Recommend treating sinus symptoms with OTC medications including decongestants (Sudafed or Coricidin), mucolytics (Mucinex or Robitussin), nasal saline spray or Neti pot, and Tylenol or ibuprofen for pain and fever.   Also recommend use of nasal steroid sprays (i.e. Flonase). Afrin (OTC decongestant spray) used SPARINGLY (not for more than 2-3 days in a row) can help decrease nasal congestion and help sinuses to drain.    Will prescribe antibiotics at this time as well due to duration of symptoms. Will change from Augmentin to Doxycycline to treat sinusitis and possible bronchitis.  Will not document an allergy to Augmentin at this time. If she tries this antibiotic in the future and develops a rash again, will document an allergy.

## 2024-04-05 NOTE — ASSESSMENT
[FreeTextEntry1] : She was taking Augmentin for sinusitis and developed a rash on her chest. This is very mild today and it is not clearly a drug reaction.  She feels ths she still has sinus symptoms. She is also wheezing when she coughs and likely has bronchitis. She declines steroids because she is concerned she will get palpitations.

## 2024-04-05 NOTE — HEALTH RISK ASSESSMENT
[0] : 2) Feeling down, depressed, or hopeless: Not at all (0) [PHQ-2 Negative - No further assessment needed] : PHQ-2 Negative - No further assessment needed [Former] : Former [20 or more] : 20 or more [> 15 Years] : > 15 Years [[id number]] : 0

## 2024-04-05 NOTE — HISTORY OF PRESENT ILLNESS
[FreeTextEntry8] : Patient presents with a rash. She was prescribed Augmentin at Urgent Care for a sinus infection 3 days ago. She had taken Augmentin previously 2 months ago for a sinus infection and had no reaction to this. She admits that she did not finish this and held onto the pills. About a week ago she wasn't feeling well so she restarted the Augmentin. She went to Urgent Care on Tuesday and the Augmentin was refilled. The rash appeared last night. She describes this as red bumps on her chest. It is not itchy. She did put Vicks Vaporub on her chest last night as well. She admits that she usually eats a gluten free diet and did have some gluten yesterday. She is also using Robitussin but does not feel that her chest is clearing.

## 2024-04-05 NOTE — REVIEW OF SYSTEMS
[Skin Rash] : skin rash [Negative] : Heme/Lymph [Wheezing] : wheezing [Cough] : cough [FreeTextEntry4] : see HPI

## 2024-04-05 NOTE — PHYSICAL EXAM
[No Focal Deficits] : no focal deficits [Normal] : affect was normal and insight and judgment were intact [No Respiratory Distress] : no respiratory distress  [de-identified] : congested nasal mucosa, +sinus pressure [de-identified] : +wheeze with cough

## 2024-04-29 ENCOUNTER — APPOINTMENT (OUTPATIENT)
Dept: OBGYN | Facility: CLINIC | Age: 59
End: 2024-04-29

## 2024-04-29 VITALS
BODY MASS INDEX: 22.02 KG/M2 | WEIGHT: 129 LBS | DIASTOLIC BLOOD PRESSURE: 70 MMHG | SYSTOLIC BLOOD PRESSURE: 122 MMHG | HEIGHT: 64 IN

## 2024-04-29 DIAGNOSIS — R92.30 DENSE BREASTS, UNSPECIFIED: ICD-10-CM

## 2024-04-29 DIAGNOSIS — N95.2 POSTMENOPAUSAL ATROPHIC VAGINITIS: ICD-10-CM

## 2024-04-29 DIAGNOSIS — Z12.31 ENCOUNTER FOR SCREENING MAMMOGRAM FOR MALIGNANT NEOPLASM OF BREAST: ICD-10-CM

## 2024-04-29 DIAGNOSIS — Z01.419 ENCOUNTER FOR GYNECOLOGICAL EXAMINATION (GENERAL) (ROUTINE) W/OUT ABNORMAL FINDINGS: ICD-10-CM

## 2024-04-29 PROCEDURE — 99396 PREV VISIT EST AGE 40-64: CPT | Mod: GY

## 2024-04-29 PROCEDURE — G0101: CPT

## 2024-04-29 RX ORDER — ESTRADIOL 10 UG/1
10 TABLET VAGINAL
Qty: 34 | Refills: 3 | Status: ACTIVE | COMMUNITY
Start: 2024-04-29 | End: 1900-01-01

## 2024-04-29 RX ORDER — ESTRADIOL 0.1 MG/G
0.1 CREAM VAGINAL
Qty: 1 | Refills: 1 | Status: COMPLETED | COMMUNITY
Start: 2022-07-28 | End: 2024-04-29

## 2024-04-29 RX ORDER — DOXYCYCLINE HYCLATE 100 MG/1
100 CAPSULE ORAL
Qty: 20 | Refills: 0 | Status: COMPLETED | COMMUNITY
Start: 2024-04-05 | End: 2024-04-29

## 2024-04-29 NOTE — DISCUSSION/SUMMARY
[FreeTextEntry1] : Discussed with the pt the importance of exercise weight bearing,yoga, aerobics good variety,  calcium totalling  mg between food and vitamins also vitamin D 2000iu daily, fish oil.  ALso that any drop of blood after menopause is not good. Encouraged SBE FU in one year.   Discussed vaginal lubricants OTC like luvena moisturizers,ky ovule and relplense . There are also vaginal estrogens, and coconut oil with intercourse. discussed colonoscopy and derma appt. pt does theses both

## 2024-04-29 NOTE — HISTORY OF PRESENT ILLNESS
Encounter Date: 6/29/2022       History     Chief Complaint   Patient presents with    Shoulder Injury     Patient reports to the ER with pain to his right shoulder, states he fell a few days ago while jogging     The patient is a 35 year old man with no PMH who presents to ED for shoulder pain.   2d ago he was running and tripped on the asphault, landing on his right shoulder.   He reports 7/10 sharp pain focused around right clavicle, but is able to move all   extremities. Only limitation is abduction of shoulder above 90 degrees 2/2 pain.   Has been taking ibuprofen as box recommends with pain relief. No fevers, chills,   other sites of pain, or other concerns.         Review of patient's allergies indicates:  No Known Allergies     History reviewed. No pertinent past medical history.  No past surgical history on file.  History reviewed. No pertinent family history.     Review of Systems   Constitutional: Negative for activity change, appetite change, fatigue, fever and unexpected weight change.   HENT: Negative for congestion, ear pain, mouth sores, nosebleeds, rhinorrhea, sinus pressure, sneezing and sore throat.    Eyes: Negative for pain, discharge, redness and itching.   Respiratory: Negative for apnea, cough, chest tightness and shortness of breath.    Cardiovascular: Negative for chest pain, palpitations and leg swelling.   Gastrointestinal: Negative for abdominal distention, abdominal pain, anal bleeding, constipation, diarrhea, nausea and vomiting.   Endocrine: Negative.    Genitourinary: Negative for dysuria, enuresis, flank pain and frequency.   Musculoskeletal: Negative for arthralgias, back pain, neck pain and neck stiffness.        Right shoulder pain   Skin: Negative for color change and wound.   Allergic/Immunologic: Negative.    Neurological: Negative for dizziness, tremors, syncope, facial asymmetry, speech difficulty, weakness, light-headedness, numbness and headaches.   Hematological:  Negative for adenopathy. Does not bruise/bleed easily.   Psychiatric/Behavioral: Negative for agitation, behavioral problems, hallucinations, self-injury and suicidal ideas. The patient is not nervous/anxious.        Physical Exam     Initial Vitals [06/29/22 1703]   BP Pulse Resp Temp SpO2   (!) 145/90 87 18 98 °F (36.7 °C) 100 %      MAP       --         Physical Exam    Constitutional: Vital signs are normal. He appears well-developed and well-nourished. He is cooperative.   HENT:   Head: Normocephalic and atraumatic.   Right Ear: Hearing, tympanic membrane, external ear and ear canal normal.   Left Ear: Hearing, tympanic membrane, external ear and ear canal normal.   Nose: Nose normal.   Mouth/Throat: Uvula is midline, oropharynx is clear and moist and mucous membranes are normal.   Eyes: Conjunctivae, EOM and lids are normal. Pupils are equal, round, and reactive to light.   Neck: Neck supple. No JVD present.   Normal range of motion.   Full passive range of motion without pain.     Cardiovascular: Normal rate, regular rhythm, S1 normal, S2 normal, normal heart sounds, intact distal pulses and normal pulses.   Pulmonary/Chest: Effort normal and breath sounds normal.   Abdominal: Abdomen is soft and flat. Bowel sounds are normal.   Musculoskeletal:      Cervical back: Full passive range of motion without pain, normal range of motion and neck supple.      Comments: Right shoulder pain consistent with clavicle fracture     Neurological: He is alert and oriented to person, place, and time. He has normal strength.   Skin: Skin is warm, dry and intact. Capillary refill takes less than 2 seconds.         ED Course   Procedures  Labs Reviewed - No data to display       Imaging Results          X-Ray Shoulder Complete 2 View Right (In process)                  Medications - No data to display  Medical Decision Making:   Initial Assessment:   Right shoulder pain after a accidental injury    Differential Diagnosis:  [FreeTextEntry1] : 60 yo here for av  some vag dryness,  no bleeding .    Sprain, strain, rotator cuff injury, dislocation, clavicle fracture, AC joint separation    Clinical Tests:   Radiological Study: Ordered and Reviewed    ED Management:  Right clavicle fracture, patient placed in a sling and swath  Motrin for pain, orthopedic follow-up in next 48 hours  Return to the ER with any concerning symptoms after discharge                      Clinical Impression:   Final diagnoses:  [M25.511] Right shoulder pain  [S42.021A] Closed displaced fracture of shaft of right clavicle, initial encounter (Primary)          ED Disposition Condition    Discharge Stable        ED Prescriptions     Medication Sig Dispense Start Date End Date Auth. Provider    ibuprofen (ADVIL,MOTRIN) 800 MG tablet Take 1 tablet (800 mg total) by mouth every 6 (six) hours as needed for Pain. 20 tablet 6/29/2022  Danny Price MD        Follow-up Information     Follow up With Specialties Details Why Contact Info    Paco Mchugh MD Orthopedic Surgery Schedule an appointment as soon as possible for a visit in 2 days  [address]  [address]  [facility] [place] [address]  [phone]             Danny Price MD  06/29/22 9543     [Currently Active] : currently active [Men] : men

## 2024-04-30 ENCOUNTER — RX RENEWAL (OUTPATIENT)
Age: 59
End: 2024-04-30

## 2024-04-30 LAB — HPV HIGH+LOW RISK DNA PNL CVX: NOT DETECTED

## 2024-04-30 RX ORDER — LOSARTAN POTASSIUM 50 MG/1
50 TABLET, FILM COATED ORAL DAILY
Qty: 90 | Refills: 3 | Status: ACTIVE | COMMUNITY
Start: 2024-04-30 | End: 1900-01-01

## 2024-05-02 LAB — CYTOLOGY CVX/VAG DOC THIN PREP: ABNORMAL

## 2024-06-04 ENCOUNTER — LABORATORY RESULT (OUTPATIENT)
Age: 59
End: 2024-06-04

## 2024-06-04 ENCOUNTER — APPOINTMENT (OUTPATIENT)
Dept: FAMILY MEDICINE | Facility: CLINIC | Age: 59
End: 2024-06-04
Payer: COMMERCIAL

## 2024-06-04 VITALS
HEART RATE: 74 BPM | HEIGHT: 64 IN | WEIGHT: 129 LBS | SYSTOLIC BLOOD PRESSURE: 122 MMHG | BODY MASS INDEX: 22.02 KG/M2 | TEMPERATURE: 97.2 F | DIASTOLIC BLOOD PRESSURE: 82 MMHG | OXYGEN SATURATION: 98 %

## 2024-06-04 DIAGNOSIS — M79.673 PAIN IN UNSPECIFIED FOOT: ICD-10-CM

## 2024-06-04 DIAGNOSIS — M25.50 PAIN IN UNSPECIFIED JOINT: ICD-10-CM

## 2024-06-04 PROCEDURE — 99213 OFFICE O/P EST LOW 20 MIN: CPT

## 2024-06-04 PROCEDURE — 36415 COLL VENOUS BLD VENIPUNCTURE: CPT

## 2024-06-04 NOTE — PHYSICAL EXAM
[Normal] : affect was normal and insight and judgment were intact [de-identified] : bilateral lateral heel discomfort, worse with palpation, no redness or swelling

## 2024-06-04 NOTE — PLAN
[FreeTextEntry1] : - reassurance that unlikely to be due to glucose, recommend to decrease carbs and sugars in diet  - wear comfortable shoes  - potentially PT if podiatry doesn't help  - ice/heat, NSAIDs as needed for discomfort

## 2024-06-04 NOTE — HISTORY OF PRESENT ILLNESS
[FreeTextEntry8] : - pt complaining of bilateral foot pain x 1 month  - back of heel pain  - worse in the morning  - denies redness, swelling, numbness, tingling  - Advil helps but doesn't like taking it too often - trying to wear comfortable shoes  - father had DM, pt states that she was borderline at some point but trying to work on diet  - hx of RA - on Hydroxychloroquine, will see rheum at the end of this month  - states that was bitten by an insect before it started hurting, unsure what it was - worried about Lyme - denies rash, fever

## 2024-06-04 NOTE — REVIEW OF SYSTEMS
[Negative] : Psychiatric [FreeTextEntry9] : bilateral lateral heel discomfort, worse with palpation, no redness or swelling

## 2024-06-06 ENCOUNTER — TRANSCRIPTION ENCOUNTER (OUTPATIENT)
Age: 59
End: 2024-06-06

## 2024-06-18 ENCOUNTER — APPOINTMENT (OUTPATIENT)
Dept: FAMILY MEDICINE | Facility: CLINIC | Age: 59
End: 2024-06-18
Payer: COMMERCIAL

## 2024-06-18 ENCOUNTER — NON-APPOINTMENT (OUTPATIENT)
Age: 59
End: 2024-06-18

## 2024-06-18 VITALS
OXYGEN SATURATION: 97 % | WEIGHT: 129 LBS | HEIGHT: 64 IN | DIASTOLIC BLOOD PRESSURE: 72 MMHG | SYSTOLIC BLOOD PRESSURE: 130 MMHG | BODY MASS INDEX: 22.02 KG/M2 | TEMPERATURE: 97.5 F | HEART RATE: 88 BPM

## 2024-06-18 DIAGNOSIS — R20.2 PARESTHESIA OF SKIN: ICD-10-CM

## 2024-06-18 DIAGNOSIS — M54.2 CERVICALGIA: ICD-10-CM

## 2024-06-18 PROCEDURE — 99213 OFFICE O/P EST LOW 20 MIN: CPT

## 2024-06-18 NOTE — PLAN
[FreeTextEntry1] : - follow up with rheum  - if develop chest pain, SOB, dizziness, nausea, slurred speech - go to the ER

## 2024-06-18 NOTE — HISTORY OF PRESENT ILLNESS
[FreeTextEntry8] : - left arm tingling x 2 days  - has been having bilateral foot tingling for a month now  - denies chest pain, SOB, dizziness, nausea, headache, vision changes  - seeing rheum tomorrow for a regular check up - will mention the feet tingling and the arm  - improves with laying down in a certain position - on her right side with left arm slightly pulled up  - denies injuries

## 2024-06-18 NOTE — PHYSICAL EXAM
[Normal] : affect was normal and insight and judgment were intact [de-identified] : bilateral hands and fingers are warm, cap refill <3secs

## 2024-06-19 ENCOUNTER — APPOINTMENT (OUTPATIENT)
Dept: RHEUMATOLOGY | Facility: CLINIC | Age: 59
End: 2024-06-19
Payer: COMMERCIAL

## 2024-06-19 ENCOUNTER — RX RENEWAL (OUTPATIENT)
Age: 59
End: 2024-06-19

## 2024-06-19 VITALS
WEIGHT: 127 LBS | BODY MASS INDEX: 21.68 KG/M2 | TEMPERATURE: 97.1 F | HEIGHT: 64 IN | OXYGEN SATURATION: 99 % | SYSTOLIC BLOOD PRESSURE: 110 MMHG | DIASTOLIC BLOOD PRESSURE: 70 MMHG | HEART RATE: 69 BPM

## 2024-06-19 DIAGNOSIS — M19.042 PRIMARY OSTEOARTHRITIS, RIGHT HAND: ICD-10-CM

## 2024-06-19 DIAGNOSIS — R76.8 OTHER SPECIFIED ABNORMAL IMMUNOLOGICAL FINDINGS IN SERUM: ICD-10-CM

## 2024-06-19 DIAGNOSIS — M06.9 RHEUMATOID ARTHRITIS, UNSPECIFIED: ICD-10-CM

## 2024-06-19 DIAGNOSIS — R68.2 DRY MOUTH, UNSPECIFIED: ICD-10-CM

## 2024-06-19 DIAGNOSIS — M19.049 PRIMARY OSTEOARTHRITIS, UNSPECIFIED HAND: ICD-10-CM

## 2024-06-19 DIAGNOSIS — M19.041 PRIMARY OSTEOARTHRITIS, RIGHT HAND: ICD-10-CM

## 2024-06-19 DIAGNOSIS — H04.123 DRY EYE SYNDROME OF BILATERAL LACRIMAL GLANDS: ICD-10-CM

## 2024-06-19 DIAGNOSIS — M85.80 OTHER SPECIFIED DISORDERS OF BONE DENSITY AND STRUCTURE, UNSPECIFIED SITE: ICD-10-CM

## 2024-06-19 DIAGNOSIS — M77.11 LATERAL EPICONDYLITIS, RIGHT ELBOW: ICD-10-CM

## 2024-06-19 PROCEDURE — 99214 OFFICE O/P EST MOD 30 MIN: CPT

## 2024-06-19 PROCEDURE — G2211 COMPLEX E/M VISIT ADD ON: CPT | Mod: NC

## 2024-06-19 RX ORDER — HYDROXYCHLOROQUINE SULFATE 200 MG/1
200 TABLET, FILM COATED ORAL
Qty: 30 | Refills: 2 | Status: ACTIVE | COMMUNITY
Start: 2017-11-13 | End: 1900-01-01

## 2024-06-19 RX ORDER — DICLOFENAC SODIUM 1% 10 MG/G
1 GEL TOPICAL
Qty: 1 | Refills: 0 | Status: ACTIVE | COMMUNITY
Start: 2024-06-19 | End: 1900-01-01

## 2024-06-19 NOTE — REASON FOR VISIT
[Follow-Up: _____] : a [unfilled] follow-up visit [FreeTextEntry1] : RA; OA; Osteopenia; review labs/meds; bone health.

## 2024-06-19 NOTE — HISTORY OF PRESENT ILLNESS
[FreeTextEntry1] : 58 y/o F for follow up w/ RA w/ hx of weak +CCP on Plaquenil states it is helping her joint aches in the hands/MCPs/PIPs/ elbows/knee/ankles/toes. States she is tolerating  mg daily well and had deferred higher dose that caused palpation in the past. Today she has no swelling or redness or warmth of any joints on the  mg daily at this time. She states she wants to continue the HCQ because without it she feels more joint pain. States eye exam on HCQ was ok w/ Optho, Dr. Hill 9/2023 and will f/u for eye exam soon and alert us if any concerns. States she can note achy pain on the Rt lateral side of the elbow lately. No effusion. Has good ROM in b/l shoulders, no pelvic/girdle stiffness and able to stand up without using her hands, no temporal pain/unremitting headaches, no vision changes, no jaw pain. Denies any fever/chills, no rashes, no ulcers, + dry eyes on artificial tears w/ Optho, + dry mouth at times, no raynaud's, no GI/ symptoms at this time.  She has Osteopenia on Dexa & needs referral again to monitor.

## 2024-06-19 NOTE — PHYSICAL EXAM
[General Appearance - Alert] : alert [General Appearance - In No Acute Distress] : in no acute distress [Sclera] : the sclera and conjunctiva were normal [Extraocular Movements] : extraocular movements were intact [Outer Ear] : the ears and nose were normal in appearance [Neck Appearance] : the appearance of the neck was normal [Respiration, Rhythm And Depth] : normal respiratory rhythm and effort [Heart Rate And Rhythm] : heart rate was normal and rhythm regular [Heart Sounds] : normal S1 and S2 [Abdomen Soft] : soft [Abdomen Tenderness] : non-tender [Cervical Lymph Nodes Enlarged Anterior Bilaterally] : anterior cervical [Supraclavicular Lymph Nodes Enlarged Bilaterally] : supraclavicular [No CVA Tenderness] : no ~M costovertebral angle tenderness [Motor Tone] : muscle strength and tone were normal [] : no rash [No Focal Deficits] : no focal deficits [Impaired Insight] : insight and judgment were intact [Mood] : the mood was normal [FreeTextEntry1] : Rt lateral elbow tenderness; mild heberden nodes at DIPs; No synovitis or effusion on exam noted today; Good ROM in b/l shoulders, no pelvic/girdle stiffness and able to stand up without using her hands

## 2024-06-19 NOTE — ASSESSMENT
[FreeTextEntry1] : 58 y/o F w/ hx of weak +CCP=22 with pain in b/l hands around the MCPs and PIPs daily w/ swelling and stiffness with symptoms concerning for RA. Also notes aches in the b/l hands, elbows, shoulders, knees, ankles, toes intermittently. Notes some secondary RA symptoms of dry eyes and dry mouth at times. -No synovitis or effusion on exam noted today on  mg daily at this time -states she wants to continue the HCQ because without it she feels more joint pain -reviewed labs 3/13/24 w/ ESR normal; CRP normal; CBC ok; CMP ok; ROBERT negative; 2/1/23 ESR normal; CRP normal; ROBERT negative; early Sjogren abs negative; 9/13/22 w/ CBC/CMP ok; TSH normal; vit D normal; 12/16/21 w/ CBC/CMP ok; urine w/ small blood (saw her urologist to monitors there from the past also w/ workup negative reported); 12/14/21 normal ESR/CRP; RO/LA negative -labs 9/30/2021 w/ normal ESR/CRP; CCP negative now on HCQ; RF negative; ROBERT w/ IF negative; CBC/CMP ok; vit D high (to cut down on dose); labs 10/30/2017 w/ +CCP=22: +ROBERT 1:80 homogenous -discussed r/b/s of refilling  mg daily, closer to wt based w/ G6PD normal w/ prescription sent as below & had deferred higher dose as felt palpations then -states she sees OptDr. Sergio ashraf with eye exam normal on HCQ 9/2023 and knows to follow up to monitor -labs as below to monitor  -US b/l hands/wrists on HCQ from [facility] radiology with tiny ganglion cyst in the Rt radial dorsal wrist without effusion or synovitis on HCQ -dry eyes: artificial tears PRN and states seen w/ her Dr. Sergio Jett; RO/LA negative; early Sjogren abs Negative; discussed can be secondary symptoms of RA -dry mouth: discussed biotene lozenges prescribed; biotene mouthwash or spray or toothpaste PRN -xray b/l hands 10/12/2018 w/ mild b/l scaphotrapezium osteoarthropathy -xray c-spine 10/12/2018 w/ discogenic disease and arthrosis  OA hands: noted to have heberden nodes at DIPs with PIP hypertrophy  -discussed r/b/s of voltaren gel 1% PRN w/ pt agreeable and prescription sent as below  Rt Lateral epicondylitis: discussed using ace band right below elbow and to rest it. -discussed can consider steroid injection and defers it at this time  +ROBERT 1:80 homogenous: discussed likely being seen in the setting of RA and Negative ROBERT on repeat 3/13/24 -Clinically without much symptoms of CTD/lupus at this time and educated on symptoms to monitor for in detail if she evolves. -disease activity markers were stable in 9/30/21 w/ DS-DNA neg; C3/C4 WNL; ROBERT negative  Osteopenia: on Dexa 12/21/21 -Dexa referral given to do to monitor -encouraged Ca+vitD supplementation -encouraged weight bearing exercises as tolerated.  -educated on symptoms to monitor for in detail and alert us if any concerns. -knows to stay up to date on health maintenance w/ PCP -f/u in 2-3 mo w/ labs, Dexa or sooner as needed.

## 2024-07-03 ENCOUNTER — APPOINTMENT (OUTPATIENT)
Dept: FAMILY MEDICINE | Facility: CLINIC | Age: 59
End: 2024-07-03
Payer: COMMERCIAL

## 2024-07-03 VITALS
BODY MASS INDEX: 21.68 KG/M2 | HEIGHT: 64 IN | WEIGHT: 127 LBS | HEART RATE: 90 BPM | DIASTOLIC BLOOD PRESSURE: 64 MMHG | SYSTOLIC BLOOD PRESSURE: 122 MMHG | TEMPERATURE: 97.7 F | OXYGEN SATURATION: 99 %

## 2024-07-03 DIAGNOSIS — R20.2 PARESTHESIA OF SKIN: ICD-10-CM

## 2024-07-03 DIAGNOSIS — I10 ESSENTIAL (PRIMARY) HYPERTENSION: ICD-10-CM

## 2024-07-03 PROCEDURE — 99213 OFFICE O/P EST LOW 20 MIN: CPT

## 2024-09-16 ENCOUNTER — RESULT CHARGE (OUTPATIENT)
Age: 59
End: 2024-09-16

## 2024-09-16 ENCOUNTER — APPOINTMENT (OUTPATIENT)
Dept: FAMILY MEDICINE | Facility: CLINIC | Age: 59
End: 2024-09-16
Payer: COMMERCIAL

## 2024-09-16 ENCOUNTER — MED ADMIN CHARGE (OUTPATIENT)
Age: 59
End: 2024-09-16

## 2024-09-16 PROCEDURE — 90686 IIV4 VACC NO PRSV 0.5 ML IM: CPT

## 2024-09-16 PROCEDURE — G0008: CPT

## 2024-09-17 ENCOUNTER — NON-APPOINTMENT (OUTPATIENT)
Age: 59
End: 2024-09-17

## 2024-09-17 ENCOUNTER — APPOINTMENT (OUTPATIENT)
Dept: FAMILY MEDICINE | Facility: CLINIC | Age: 59
End: 2024-09-17
Payer: COMMERCIAL

## 2024-09-17 VITALS
BODY MASS INDEX: 22.02 KG/M2 | DIASTOLIC BLOOD PRESSURE: 80 MMHG | TEMPERATURE: 97.6 F | SYSTOLIC BLOOD PRESSURE: 120 MMHG | HEART RATE: 67 BPM | OXYGEN SATURATION: 97 % | WEIGHT: 129 LBS | HEIGHT: 64 IN

## 2024-09-17 DIAGNOSIS — E55.9 VITAMIN D DEFICIENCY, UNSPECIFIED: ICD-10-CM

## 2024-09-17 DIAGNOSIS — E78.5 HYPERLIPIDEMIA, UNSPECIFIED: ICD-10-CM

## 2024-09-17 DIAGNOSIS — Z00.00 ENCOUNTER FOR GENERAL ADULT MEDICAL EXAMINATION W/OUT ABNORMAL FINDINGS: ICD-10-CM

## 2024-09-17 DIAGNOSIS — I10 ESSENTIAL (PRIMARY) HYPERTENSION: ICD-10-CM

## 2024-09-17 LAB
BASOPHILS # BLD AUTO: 0.09 K/UL
BASOPHILS NFR BLD AUTO: 1.8 %
EOSINOPHIL # BLD AUTO: 0.31 K/UL
EOSINOPHIL NFR BLD AUTO: 6 %
ESTIMATED AVERAGE GLUCOSE: 117 MG/DL
HBA1C MFR BLD HPLC: 5.7 %
HCT VFR BLD CALC: 45.3 %
HGB BLD-MCNC: 14.5 G/DL
IMM GRANULOCYTES NFR BLD AUTO: 0 %
LYMPHOCYTES # BLD AUTO: 2.18 K/UL
LYMPHOCYTES NFR BLD AUTO: 42.4 %
MAN DIFF?: NORMAL
MCHC RBC-ENTMCNC: 30 PG
MCHC RBC-ENTMCNC: 32 GM/DL
MCV RBC AUTO: 93.6 FL
MONOCYTES # BLD AUTO: 0.45 K/UL
MONOCYTES NFR BLD AUTO: 8.8 %
NEUTROPHILS # BLD AUTO: 2.11 K/UL
NEUTROPHILS NFR BLD AUTO: 41 %
PLATELET # BLD AUTO: 240 K/UL
RBC # BLD: 4.84 M/UL
RBC # FLD: 13 %
WBC # FLD AUTO: 5.14 K/UL

## 2024-09-17 PROCEDURE — 93000 ELECTROCARDIOGRAM COMPLETE: CPT

## 2024-09-17 PROCEDURE — 99396 PREV VISIT EST AGE 40-64: CPT

## 2024-09-17 NOTE — HEALTH RISK ASSESSMENT
[Yes] : Yes [2 - 3 times a week (3 pts)] : 2 - 3  times a week (3 points) [1 or 2 (0 pts)] : 1 or 2 (0 points) [Never (0 pts)] : Never (0 points) [No] : In the past 12 months have you used drugs other than those required for medical reasons? No [No falls in past year] : Patient reported no falls in the past year [0] : 2) Feeling down, depressed, or hopeless: Not at all (0) [PHQ-2 Negative - No further assessment needed] : PHQ-2 Negative - No further assessment needed [Never] : Never [HIV test declined] : HIV test declined [Hepatitis C test declined] : Hepatitis C test declined [None] : None [With Family] : lives with family [On disability] : on disability [] :  [# Of Children ___] : has [unfilled] children [Sexually Active] : sexually active [Feels Safe at Home] : Feels safe at home [Fully functional (bathing, dressing, toileting, transferring, walking, feeding)] : Fully functional (bathing, dressing, toileting, transferring, walking, feeding) [Fully functional (using the telephone, shopping, preparing meals, housekeeping, doing laundry, using] : Fully functional and needs no help or supervision to perform IADLs (using the telephone, shopping, preparing meals, housekeeping, doing laundry, using transportation, managing medications and managing finances) [With Patient/Caregiver] : , with patient/caregiver [Reviewed no changes] : Reviewed, no changes [I will adhere to the patient's wishes.] : I will adhere to the patient's wishes. [Audit-CScore] : 3 [de-identified] : exercises regularly [de-identified] : well balanced [[id number]] : 0 [EyeExamDate] : 2023 [Patient reported mammogram was normal] : Patient reported mammogram was normal [Patient reported PAP Smear was normal] : Patient reported PAP Smear was normal [Patient reported bone density results were abnormal] : Patient reported bone density results were abnormal [Patient reported colonoscopy was normal] : Patient reported colonoscopy was normal [Change in mental status noted] : No change in mental status noted [Language] : denies difficulty with language [High Risk Behavior] : no high risk behavior [MammogramDate] : 05/2024 [PapSmearDate] : 04/2024 [BoneDensityDate] : 12/2021 [ColonoscopyDate] : 02/2019

## 2024-09-17 NOTE — HISTORY OF PRESENT ILLNESS
[FreeTextEntry1] : CPE. [de-identified] : Patient is a 58yo female with PMH HTN, HLD, prediabetes, anxiety, RA who presents to the office for CPE.    Last CPE:  09/14/2023 with myself.  GYN Exam:  04/2024, Zoya Alfaro. Mammogram:  05/2024, BIRADS-1 ([facility]).  DEXA:  due, recommended today; 12/2021, osteopenia (ordered by Dr. Maciel 06/2024).  Colonoscopy:  02/2019, Dr. Dos Santos. Patient reported colonoscopy was normal. internal hemorrhoids otherwise WNL, repeat 10 years.  Ophthalmology:  2023, Dr. Rubio.  Dermatology:  [facility].  Dentist:  UTMUKESH. Shingles:  declines.  Flu:  09/2024.  Tdap:  due, declines.  COVID:  UTD.   Rheumatology: Dr. Maciel.  Cardiology: has seen Dr. Mattson in the past for w/u in 2022 which was normal.   Pt had b/w done prior (yesterday), partial results received and reviewed with pt in the office today.  CBC WNL. A1C 5.7% in prediabetic range.  Lifestyle modifications encouraged.  Zive-7 supplement to help regulate blood sugar and intestinal microbiome.  Has improved bloating.

## 2024-09-17 NOTE — ASSESSMENT
[FreeTextEntry1] : Patient is a 58yo female with PMH HTN, HLD, prediabetes, anxiety, RA who presents to the office for CPE.  Health Maintenance - Due for rpt DEXA, recommended pt schedule (ordered by Rheumatology). - B/w done prior, CBC/A1C reviewed with pt in office.  Remainder of labs still pending. - EKG performed in office NSR, no acute ST/T changes, no arrhythmias.  Pt denies cardiac complaints. - Eat plenty of fruits and vegetables, especially deeply colored fruits/vegetables (such as leafy greens, peaches) that are more nutrient-dense.  Continue to work hard on diet and exercise, limiting/avoiding saturated fat, fatty foods, greasy foods, red meats, white flour-based carbohydrates (cookies, cakes, white bread, white rice), and added sugars.  Chose whole grain foods and products made with whole grains over refined grains and white flour-based carbohydrates.  Avoid beverages and food with added sugar.  Limit salt intake to improve blood pressure.  Limit alcohol intake. - Try and incorporate 30 minutes of aerobic exercise to your daily routine.  HTN - BP at goal today in office. - Continue Losartan 50mg daily. - Monitor blood pressure at home, keep log, alert office if too high/too low. - DASH diet encouraged, regular exercise encouraged. - Alert office if you develop any new, worsening or concerning symptoms including headache, vision changes, dizziness, loss of consciousness, chest pain, shortness of breath, or lower extremity edema.  HLD - Lipid panel pending. - Hesitant to start statins, not currently taking supplements. - Limit/avoid greasy foods, fried foods, fatty foods, and saturated fat in your diet and try and eat more lean proteins.  Pt will continue regular f/u with Rheumatology.  Call the office or go to the ED immediately if you develop new, worsening or concerning symptoms including high fever, severe headache/worst headache of your life, confusion, dizziness/lightheadedness, loss of consciousness, severe chest pain, difficulty breathing, shortness of breath, severe abdominal pain, excessive vomiting/diarrhea, inability to feel/move the extremities, or any other concerning symptoms.

## 2024-09-18 ENCOUNTER — TRANSCRIPTION ENCOUNTER (OUTPATIENT)
Age: 59
End: 2024-09-18

## 2024-09-18 LAB
ALBUMIN SERPL ELPH-MCNC: 4.6 G/DL
ALP BLD-CCNC: 64 U/L
ALT SERPL-CCNC: 20 U/L
ANION GAP SERPL CALC-SCNC: 14 MMOL/L
AST SERPL-CCNC: 25 U/L
BILIRUB SERPL-MCNC: 0.2 MG/DL
BUN SERPL-MCNC: 19 MG/DL
CALCIUM SERPL-MCNC: 9.8 MG/DL
CHLORIDE SERPL-SCNC: 103 MMOL/L
CHOLEST SERPL-MCNC: 285 MG/DL
CO2 SERPL-SCNC: 24 MMOL/L
CREAT SERPL-MCNC: 0.84 MG/DL
EGFR: 80 ML/MIN/1.73M2
GLUCOSE SERPL-MCNC: 107 MG/DL
HDLC SERPL-MCNC: 87 MG/DL
LDLC SERPL CALC-MCNC: 173 MG/DL
NONHDLC SERPL-MCNC: 198 MG/DL
POTASSIUM SERPL-SCNC: 4.5 MMOL/L
PROT SERPL-MCNC: 7.1 G/DL
SODIUM SERPL-SCNC: 141 MMOL/L
TRIGL SERPL-MCNC: 143 MG/DL
TSH SERPL-ACNC: 2.77 UIU/ML

## 2024-09-23 ENCOUNTER — APPOINTMENT (OUTPATIENT)
Dept: RHEUMATOLOGY | Facility: CLINIC | Age: 59
End: 2024-09-23
Payer: COMMERCIAL

## 2024-09-23 ENCOUNTER — RX RENEWAL (OUTPATIENT)
Age: 59
End: 2024-09-23

## 2024-09-23 DIAGNOSIS — R68.2 DRY MOUTH, UNSPECIFIED: ICD-10-CM

## 2024-09-23 DIAGNOSIS — R73.03 PREDIABETES.: ICD-10-CM

## 2024-09-23 DIAGNOSIS — R76.8 OTHER SPECIFIED ABNORMAL IMMUNOLOGICAL FINDINGS IN SERUM: ICD-10-CM

## 2024-09-23 DIAGNOSIS — M19.041 PRIMARY OSTEOARTHRITIS, RIGHT HAND: ICD-10-CM

## 2024-09-23 DIAGNOSIS — H04.123 DRY EYE SYNDROME OF BILATERAL LACRIMAL GLANDS: ICD-10-CM

## 2024-09-23 DIAGNOSIS — M85.80 OTHER SPECIFIED DISORDERS OF BONE DENSITY AND STRUCTURE, UNSPECIFIED SITE: ICD-10-CM

## 2024-09-23 DIAGNOSIS — M19.049 PRIMARY OSTEOARTHRITIS, UNSPECIFIED HAND: ICD-10-CM

## 2024-09-23 DIAGNOSIS — M19.042 PRIMARY OSTEOARTHRITIS, RIGHT HAND: ICD-10-CM

## 2024-09-23 DIAGNOSIS — M06.9 RHEUMATOID ARTHRITIS, UNSPECIFIED: ICD-10-CM

## 2024-09-23 PROCEDURE — 99214 OFFICE O/P EST MOD 30 MIN: CPT

## 2024-09-23 PROCEDURE — G2211 COMPLEX E/M VISIT ADD ON: CPT | Mod: NC

## 2024-09-23 NOTE — ASSESSMENT
[FreeTextEntry1] : 58 y/o F w/ hx of weak +CCP=22 with pain in b/l hands around the MCPs and PIPs daily w/ swelling and stiffness with symptoms concerning for RA. Also notes aches in the b/l hands, elbows, shoulders, knees, ankles, toes intermittently. Notes some secondary RA symptoms of dry eyes and dry mouth at times. -No synovitis or effusion on exam noted today on  mg daily at this time -states she wants to continue the HCQ because without it she feels more joint pain -reviewed labs 9/17/24 w/ CBC ok; CMP ok; TSH normal; ucA0P=1.7 (pre-DM detailed below); 3/13/24 w/ ESR normal; CRP normal; CBC ok; CMP ok; ROBERT negative; 2/1/23 ESR normal; CRP normal; ROBERT negative; early Sjogren abs negative; 9/13/22 w/ CBC/CMP ok; TSH normal; vit D normal; 12/16/21 w/ CBC/CMP ok; urine w/ small blood (saw her urologist to monitors there from the past also w/ workup negative reported); 12/14/21 normal ESR/CRP; RO/LA negative -labs 9/30/2021 w/ normal ESR/CRP; CCP negative now on HCQ; RF negative; ROBERT w/ IF negative; CBC/CMP ok; vit D high (to cut down on dose); labs 10/30/2017 w/ +CCP=22: +ROBERT 1:80 homogenous -discussed r/b/s of refilling  mg daily, w/ G6PD normal w/ prescription sent as below & had deferred higher dose as felt palpations then -states she sees OptDr. Sergio ashraf with eye exam normal on HCQ 9/2023 and states has f/u 10/21/24 & knows to follow up to monitor -labs as below to monitor -US b/l hands/wrists on HCQ from [facility] radiology with tiny ganglion cyst in the Rt radial dorsal wrist without effusion or synovitis on HCQ -dry eyes: artificial tears PRN and states seen w/ her OptDr. 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 -disease activity markers were stable in 9/30/21 w/ DS-DNA neg; C3/C4 WNL; ROBERT negative  Osteopenia: on Dexa 12/21/21 -Dexa referral given to do to monitor -encouraged Ca+vitD supplementation -encouraged weight bearing exercises as tolerated.  Pre-DM: rbB4S=1.7 on labs 9/17/24 -discussed low sugar, low carb diet and exercise  -will monitor w/ PCP  -educated on symptoms to monitor for in detail and alert us if any concerns. -knows to stay up to date on health maintenance w/ PCP -f/u in 2 mo w/ labs, Dexa or sooner as needed.

## 2024-09-23 NOTE — HISTORY OF PRESENT ILLNESS
[FreeTextEntry1] : Verbal consent given for telehealth video visit on 9/23/24 by patient, via [employer] with visit done from my David englandhone at [address] [place] [address] in [place] and patient on her phone in [place].  58 y/o F for follow up w/ RA w/ hx of weak +CCP on Plaquenil states it is helping her joint aches in the hands/MCPs/PIPs/ elbows/knee/ankles/toes. States she is tolerating  mg daily well and had deferred higher dose that caused palpation in the past. Today she has no swelling or redness or warmth of any joints on the  mg daily at this time. She states she wants to continue the HCQ because without it she feels more joint pain. States eye exam on HCQ was ok w/ Optho, Dr. Hill 9/2023 and will f/u for eye exam on 10/21/24 and alert us if any concerns. Has good ROM in b/l shoulders, no pelvic/girdle stiffness and able to stand up without using her hands, no temporal pain/unremitting headaches, no vision changes, no jaw pain. Denies any fever/chills, no rashes, no ulcers, + dry eyes on artificial tears w/ Optho, + dry mouth at times, no raynaud's, no GI/ symptoms at this time.  She has Osteopenia on Dexa & needs referral again to monitor.

## 2024-10-22 ENCOUNTER — APPOINTMENT (OUTPATIENT)
Dept: FAMILY MEDICINE | Facility: CLINIC | Age: 59
End: 2024-10-22
Payer: MEDICARE

## 2024-10-22 VITALS
TEMPERATURE: 97.2 F | HEART RATE: 64 BPM | BODY MASS INDEX: 21.51 KG/M2 | WEIGHT: 126 LBS | DIASTOLIC BLOOD PRESSURE: 82 MMHG | SYSTOLIC BLOOD PRESSURE: 138 MMHG | HEIGHT: 64 IN | OXYGEN SATURATION: 99 %

## 2024-10-22 DIAGNOSIS — R42 DIZZINESS AND GIDDINESS: ICD-10-CM

## 2024-10-22 DIAGNOSIS — H92.02 OTALGIA, LEFT EAR: ICD-10-CM

## 2024-10-22 PROCEDURE — 99213 OFFICE O/P EST LOW 20 MIN: CPT

## 2024-11-25 ENCOUNTER — APPOINTMENT (OUTPATIENT)
Dept: RHEUMATOLOGY | Facility: CLINIC | Age: 59
End: 2024-11-25

## 2024-11-25 ENCOUNTER — RX RENEWAL (OUTPATIENT)
Age: 59
End: 2024-11-25

## 2024-11-25 ENCOUNTER — APPOINTMENT (OUTPATIENT)
Dept: FAMILY MEDICINE | Facility: CLINIC | Age: 59
End: 2024-11-25
Payer: MEDICARE

## 2024-11-25 ENCOUNTER — NON-APPOINTMENT (OUTPATIENT)
Age: 59
End: 2024-11-25

## 2024-11-25 VITALS
SYSTOLIC BLOOD PRESSURE: 128 MMHG | OXYGEN SATURATION: 98 % | HEIGHT: 64 IN | BODY MASS INDEX: 21.51 KG/M2 | HEART RATE: 84 BPM | DIASTOLIC BLOOD PRESSURE: 82 MMHG | TEMPERATURE: 97.8 F | WEIGHT: 126 LBS

## 2024-11-25 DIAGNOSIS — R00.2 PALPITATIONS: ICD-10-CM

## 2024-11-25 DIAGNOSIS — R05.9 COUGH, UNSPECIFIED: ICD-10-CM

## 2024-11-25 DIAGNOSIS — E78.5 HYPERLIPIDEMIA, UNSPECIFIED: ICD-10-CM

## 2024-11-25 DIAGNOSIS — I10 ESSENTIAL (PRIMARY) HYPERTENSION: ICD-10-CM

## 2024-11-25 DIAGNOSIS — R73.01 IMPAIRED FASTING GLUCOSE: ICD-10-CM

## 2024-11-25 PROCEDURE — 99214 OFFICE O/P EST MOD 30 MIN: CPT | Mod: 25

## 2024-11-25 PROCEDURE — 93000 ELECTROCARDIOGRAM COMPLETE: CPT

## 2024-11-25 RX ORDER — AZITHROMYCIN 250 MG/1
250 TABLET, FILM COATED ORAL
Qty: 6 | Refills: 0 | Status: ACTIVE | COMMUNITY
Start: 2024-11-25 | End: 1900-01-01

## 2024-11-25 RX ORDER — CALCIUM CARBONATE 260MG(650)
TABLET,CHEWABLE ORAL
Refills: 0 | Status: ACTIVE | COMMUNITY

## 2024-12-16 ENCOUNTER — TRANSCRIPTION ENCOUNTER (OUTPATIENT)
Age: 59
End: 2024-12-16

## 2024-12-19 ENCOUNTER — APPOINTMENT (OUTPATIENT)
Dept: RHEUMATOLOGY | Facility: CLINIC | Age: 59
End: 2024-12-19
Payer: COMMERCIAL

## 2024-12-19 DIAGNOSIS — R76.8 OTHER SPECIFIED ABNORMAL IMMUNOLOGICAL FINDINGS IN SERUM: ICD-10-CM

## 2024-12-19 DIAGNOSIS — Z71.2 PERSON CONSULTING FOR EXPLANATION OF EXAMINATION OR TEST FINDINGS: ICD-10-CM

## 2024-12-19 DIAGNOSIS — H04.123 DRY EYE SYNDROME OF BILATERAL LACRIMAL GLANDS: ICD-10-CM

## 2024-12-19 DIAGNOSIS — M85.80 OTHER SPECIFIED DISORDERS OF BONE DENSITY AND STRUCTURE, UNSPECIFIED SITE: ICD-10-CM

## 2024-12-19 DIAGNOSIS — R68.2 DRY MOUTH, UNSPECIFIED: ICD-10-CM

## 2024-12-19 DIAGNOSIS — M19.042 PRIMARY OSTEOARTHRITIS, RIGHT HAND: ICD-10-CM

## 2024-12-19 DIAGNOSIS — M06.9 RHEUMATOID ARTHRITIS, UNSPECIFIED: ICD-10-CM

## 2024-12-19 DIAGNOSIS — M19.041 PRIMARY OSTEOARTHRITIS, RIGHT HAND: ICD-10-CM

## 2024-12-19 DIAGNOSIS — M19.049 PRIMARY OSTEOARTHRITIS, UNSPECIFIED HAND: ICD-10-CM

## 2024-12-19 PROCEDURE — 99214 OFFICE O/P EST MOD 30 MIN: CPT

## 2024-12-19 PROCEDURE — G2211 COMPLEX E/M VISIT ADD ON: CPT | Mod: NC

## 2025-02-13 ENCOUNTER — APPOINTMENT (OUTPATIENT)
Dept: RHEUMATOLOGY | Facility: CLINIC | Age: 60
End: 2025-02-13

## 2025-02-18 ENCOUNTER — APPOINTMENT (OUTPATIENT)
Dept: FAMILY MEDICINE | Facility: CLINIC | Age: 60
End: 2025-02-18

## 2025-03-06 ENCOUNTER — APPOINTMENT (OUTPATIENT)
Dept: RHEUMATOLOGY | Facility: CLINIC | Age: 60
End: 2025-03-06
Payer: COMMERCIAL

## 2025-03-06 DIAGNOSIS — M19.041 PRIMARY OSTEOARTHRITIS, RIGHT HAND: ICD-10-CM

## 2025-03-06 DIAGNOSIS — R68.2 DRY MOUTH, UNSPECIFIED: ICD-10-CM

## 2025-03-06 DIAGNOSIS — M06.9 RHEUMATOID ARTHRITIS, UNSPECIFIED: ICD-10-CM

## 2025-03-06 DIAGNOSIS — M85.80 OTHER SPECIFIED DISORDERS OF BONE DENSITY AND STRUCTURE, UNSPECIFIED SITE: ICD-10-CM

## 2025-03-06 DIAGNOSIS — H04.123 DRY EYE SYNDROME OF BILATERAL LACRIMAL GLANDS: ICD-10-CM

## 2025-03-06 DIAGNOSIS — M19.042 PRIMARY OSTEOARTHRITIS, RIGHT HAND: ICD-10-CM

## 2025-03-06 DIAGNOSIS — R76.8 OTHER SPECIFIED ABNORMAL IMMUNOLOGICAL FINDINGS IN SERUM: ICD-10-CM

## 2025-03-06 DIAGNOSIS — M19.049 PRIMARY OSTEOARTHRITIS, UNSPECIFIED HAND: ICD-10-CM

## 2025-03-06 PROCEDURE — 99214 OFFICE O/P EST MOD 30 MIN: CPT | Mod: 95

## 2025-03-06 PROCEDURE — G2211 COMPLEX E/M VISIT ADD ON: CPT | Mod: NC,95

## 2025-03-27 ENCOUNTER — LABORATORY RESULT (OUTPATIENT)
Age: 60
End: 2025-03-27

## 2025-03-27 ENCOUNTER — APPOINTMENT (OUTPATIENT)
Dept: FAMILY MEDICINE | Facility: CLINIC | Age: 60
End: 2025-03-27
Payer: COMMERCIAL

## 2025-03-27 VITALS
HEART RATE: 76 BPM | BODY MASS INDEX: 22.02 KG/M2 | OXYGEN SATURATION: 99 % | TEMPERATURE: 97.6 F | WEIGHT: 129 LBS | HEIGHT: 64 IN | DIASTOLIC BLOOD PRESSURE: 74 MMHG | SYSTOLIC BLOOD PRESSURE: 132 MMHG

## 2025-03-27 DIAGNOSIS — R20.0 ANESTHESIA OF SKIN: ICD-10-CM

## 2025-03-27 DIAGNOSIS — R73.03 PREDIABETES.: ICD-10-CM

## 2025-03-27 DIAGNOSIS — I10 ESSENTIAL (PRIMARY) HYPERTENSION: ICD-10-CM

## 2025-03-27 DIAGNOSIS — F41.9 ANXIETY DISORDER, UNSPECIFIED: ICD-10-CM

## 2025-03-27 DIAGNOSIS — M06.9 RHEUMATOID ARTHRITIS, UNSPECIFIED: ICD-10-CM

## 2025-03-27 DIAGNOSIS — E55.9 VITAMIN D DEFICIENCY, UNSPECIFIED: ICD-10-CM

## 2025-03-27 PROCEDURE — G2211 COMPLEX E/M VISIT ADD ON: CPT | Mod: NC

## 2025-03-27 PROCEDURE — 36415 COLL VENOUS BLD VENIPUNCTURE: CPT

## 2025-03-27 PROCEDURE — 99214 OFFICE O/P EST MOD 30 MIN: CPT

## 2025-03-28 ENCOUNTER — TRANSCRIPTION ENCOUNTER (OUTPATIENT)
Age: 60
End: 2025-03-28

## 2025-03-28 LAB
25(OH)D3 SERPL-MCNC: 38.6 NG/ML
BASOPHILS # BLD AUTO: 0.07 K/UL
BASOPHILS NFR BLD AUTO: 1 %
CRP SERPL-MCNC: <3 MG/L
EOSINOPHIL # BLD AUTO: 0.14 K/UL
EOSINOPHIL NFR BLD AUTO: 2 %
ERYTHROCYTE [SEDIMENTATION RATE] IN BLOOD BY WESTERGREN METHOD: 6 MM/HR
FERRITIN SERPL-MCNC: 92 NG/ML
FOLATE SERPL-MCNC: 17.3 NG/ML
HCT VFR BLD CALC: 39 %
HGB BLD-MCNC: 13.3 G/DL
IMM GRANULOCYTES NFR BLD AUTO: 0.3 %
IRON SATN MFR SERPL: 30 %
IRON SERPL-MCNC: 96 UG/DL
LYMPHOCYTES # BLD AUTO: 1.99 K/UL
LYMPHOCYTES NFR BLD AUTO: 28.5 %
MAN DIFF?: NORMAL
MCHC RBC-ENTMCNC: 30.1 PG
MCHC RBC-ENTMCNC: 34.1 G/DL
MCV RBC AUTO: 88.2 FL
MONOCYTES # BLD AUTO: 0.35 K/UL
MONOCYTES NFR BLD AUTO: 5 %
NEUTROPHILS # BLD AUTO: 4.41 K/UL
NEUTROPHILS NFR BLD AUTO: 63.2 %
PLATELET # BLD AUTO: 236 K/UL
RBC # BLD: 4.42 M/UL
RBC # FLD: 13.1 %
TIBC SERPL-MCNC: 317 UG/DL
UIBC SERPL-MCNC: 220 UG/DL
VIT B12 SERPL-MCNC: 623 PG/ML
WBC # FLD AUTO: 6.98 K/UL

## 2025-04-01 ENCOUNTER — LABORATORY RESULT (OUTPATIENT)
Age: 60
End: 2025-04-01

## 2025-04-01 ENCOUNTER — APPOINTMENT (OUTPATIENT)
Dept: FAMILY MEDICINE | Facility: CLINIC | Age: 60
End: 2025-04-01
Payer: COMMERCIAL

## 2025-04-01 PROCEDURE — 36415 COLL VENOUS BLD VENIPUNCTURE: CPT

## 2025-04-03 ENCOUNTER — TRANSCRIPTION ENCOUNTER (OUTPATIENT)
Age: 60
End: 2025-04-03

## 2025-04-21 ENCOUNTER — RX RENEWAL (OUTPATIENT)
Age: 60
End: 2025-04-21

## 2025-04-24 ENCOUNTER — APPOINTMENT (OUTPATIENT)
Dept: RHEUMATOLOGY | Facility: CLINIC | Age: 60
End: 2025-04-24

## 2025-04-30 ENCOUNTER — TRANSCRIPTION ENCOUNTER (OUTPATIENT)
Age: 60
End: 2025-04-30

## 2025-05-01 ENCOUNTER — APPOINTMENT (OUTPATIENT)
Dept: OBGYN | Facility: CLINIC | Age: 60
End: 2025-05-01

## 2025-05-01 ENCOUNTER — NON-APPOINTMENT (OUTPATIENT)
Age: 60
End: 2025-05-01

## 2025-05-01 VITALS
WEIGHT: 132 LBS | SYSTOLIC BLOOD PRESSURE: 116 MMHG | DIASTOLIC BLOOD PRESSURE: 66 MMHG | HEIGHT: 64 IN | BODY MASS INDEX: 22.53 KG/M2

## 2025-05-01 DIAGNOSIS — Z01.419 ENCOUNTER FOR GYNECOLOGICAL EXAMINATION (GENERAL) (ROUTINE) W/OUT ABNORMAL FINDINGS: ICD-10-CM

## 2025-05-01 DIAGNOSIS — R92.30 DENSE BREASTS, UNSPECIFIED: ICD-10-CM

## 2025-05-01 DIAGNOSIS — Z12.31 ENCOUNTER FOR SCREENING MAMMOGRAM FOR MALIGNANT NEOPLASM OF BREAST: ICD-10-CM

## 2025-05-01 DIAGNOSIS — Z12.11 ENCOUNTER FOR SCREENING FOR MALIGNANT NEOPLASM OF COLON: ICD-10-CM

## 2025-05-01 LAB
CARD LOT #: NORMAL
CARD LOT EXP DATE: NORMAL
DATE COLLECTED: NORMAL
DATE COLLECTED: NORMAL
DEVELOPER LOT #: NORMAL
DEVELOPER LOT EXP DATE: NORMAL
HEMOCCULT 2: NEGATIVE
HEMOCCULT SP1 STL QL: NEGATIVE
QUALITY CONTROL: YES
QUALITY CONTROL: YES

## 2025-05-01 PROCEDURE — G0101: CPT

## 2025-05-01 PROCEDURE — 82270 OCCULT BLOOD FECES: CPT

## 2025-05-01 PROCEDURE — 99396 PREV VISIT EST AGE 40-64: CPT | Mod: GY

## 2025-05-12 ENCOUNTER — RX RENEWAL (OUTPATIENT)
Age: 60
End: 2025-05-12

## 2025-05-12 ENCOUNTER — APPOINTMENT (OUTPATIENT)
Dept: RHEUMATOLOGY | Facility: CLINIC | Age: 60
End: 2025-05-12
Payer: COMMERCIAL

## 2025-05-12 DIAGNOSIS — R76.8 OTHER SPECIFIED ABNORMAL IMMUNOLOGICAL FINDINGS IN SERUM: ICD-10-CM

## 2025-05-12 DIAGNOSIS — R68.2 DRY MOUTH, UNSPECIFIED: ICD-10-CM

## 2025-05-12 DIAGNOSIS — H04.123 DRY EYE SYNDROME OF BILATERAL LACRIMAL GLANDS: ICD-10-CM

## 2025-05-12 DIAGNOSIS — M06.9 RHEUMATOID ARTHRITIS, UNSPECIFIED: ICD-10-CM

## 2025-05-12 DIAGNOSIS — M19.049 PRIMARY OSTEOARTHRITIS, UNSPECIFIED HAND: ICD-10-CM

## 2025-05-12 DIAGNOSIS — M19.041 PRIMARY OSTEOARTHRITIS, RIGHT HAND: ICD-10-CM

## 2025-05-12 DIAGNOSIS — M85.80 OTHER SPECIFIED DISORDERS OF BONE DENSITY AND STRUCTURE, UNSPECIFIED SITE: ICD-10-CM

## 2025-05-12 DIAGNOSIS — M19.042 PRIMARY OSTEOARTHRITIS, RIGHT HAND: ICD-10-CM

## 2025-05-12 PROCEDURE — 99214 OFFICE O/P EST MOD 30 MIN: CPT | Mod: 95

## 2025-05-12 PROCEDURE — G2211 COMPLEX E/M VISIT ADD ON: CPT | Mod: NC,95

## 2025-05-19 ENCOUNTER — TRANSCRIPTION ENCOUNTER (OUTPATIENT)
Age: 60
End: 2025-05-19

## 2025-06-17 PROBLEM — Z13.6 ENCOUNTER FOR SCREENING FOR CARDIOVASCULAR DISORDERS: Status: ACTIVE | Noted: 2025-06-17

## 2025-06-18 ENCOUNTER — APPOINTMENT (OUTPATIENT)
Dept: FAMILY MEDICINE | Facility: CLINIC | Age: 60
End: 2025-06-18

## 2025-07-02 ENCOUNTER — APPOINTMENT (OUTPATIENT)
Dept: FAMILY MEDICINE | Facility: CLINIC | Age: 60
End: 2025-07-02
Payer: COMMERCIAL

## 2025-07-02 VITALS
SYSTOLIC BLOOD PRESSURE: 116 MMHG | HEART RATE: 84 BPM | WEIGHT: 130 LBS | TEMPERATURE: 98.5 F | OXYGEN SATURATION: 97 % | BODY MASS INDEX: 22.2 KG/M2 | DIASTOLIC BLOOD PRESSURE: 78 MMHG | HEIGHT: 64 IN

## 2025-07-02 PROCEDURE — 36415 COLL VENOUS BLD VENIPUNCTURE: CPT

## 2025-07-02 PROCEDURE — 99213 OFFICE O/P EST LOW 20 MIN: CPT

## 2025-07-03 ENCOUNTER — TRANSCRIPTION ENCOUNTER (OUTPATIENT)
Age: 60
End: 2025-07-03

## 2025-07-03 LAB
ALBUMIN SERPL ELPH-MCNC: 4.4 G/DL
ALP BLD-CCNC: 73 U/L
ALT SERPL-CCNC: 31 U/L
ANION GAP SERPL CALC-SCNC: 12 MMOL/L
AST SERPL-CCNC: 32 U/L
BASOPHILS # BLD AUTO: 0.07 K/UL
BASOPHILS NFR BLD AUTO: 1.5 %
BILIRUB SERPL-MCNC: 0.5 MG/DL
BUN SERPL-MCNC: 19 MG/DL
CALCIUM SERPL-MCNC: 9.6 MG/DL
CHLORIDE SERPL-SCNC: 102 MMOL/L
CHOLEST SERPL-MCNC: 288 MG/DL
CO2 SERPL-SCNC: 26 MMOL/L
CREAT SERPL-MCNC: 0.79 MG/DL
CRP SERPL-MCNC: <3 MG/L
EGFRCR SERPLBLD CKD-EPI 2021: 86 ML/MIN/1.73M2
EOSINOPHIL # BLD AUTO: 0.22 K/UL
EOSINOPHIL NFR BLD AUTO: 4.6 %
ERYTHROCYTE [SEDIMENTATION RATE] IN BLOOD BY WESTERGREN METHOD: 13 MM/HR
GLUCOSE SERPL-MCNC: 105 MG/DL
HAV IGM SER QL: NONREACTIVE
HBV CORE IGM SER QL: NONREACTIVE
HBV SURFACE AG SER QL: NONREACTIVE
HCT VFR BLD CALC: 43.5 %
HCV AB SER QL: NONREACTIVE
HCV S/CO RATIO: 0.1 S/CO
HDLC SERPL-MCNC: 74 MG/DL
HGB BLD-MCNC: 13.9 G/DL
IMM GRANULOCYTES NFR BLD AUTO: 0.2 %
LDLC SERPL-MCNC: 183 MG/DL
LYMPHOCYTES # BLD AUTO: 1.85 K/UL
LYMPHOCYTES NFR BLD AUTO: 38.5 %
MAN DIFF?: NORMAL
MCHC RBC-ENTMCNC: 30.1 PG
MCHC RBC-ENTMCNC: 32 G/DL
MCV RBC AUTO: 94.2 FL
MONOCYTES # BLD AUTO: 0.42 K/UL
MONOCYTES NFR BLD AUTO: 8.7 %
NEUTROPHILS # BLD AUTO: 2.24 K/UL
NEUTROPHILS NFR BLD AUTO: 46.5 %
NONHDLC SERPL-MCNC: 214 MG/DL
PLATELET # BLD AUTO: 225 K/UL
POTASSIUM SERPL-SCNC: 4.3 MMOL/L
PROT SERPL-MCNC: 7 G/DL
RBC # BLD: 4.62 M/UL
RBC # FLD: 13 %
SODIUM SERPL-SCNC: 140 MMOL/L
TRIGL SERPL-MCNC: 169 MG/DL
WBC # FLD AUTO: 4.81 K/UL

## 2025-08-06 ENCOUNTER — APPOINTMENT (OUTPATIENT)
Dept: RHEUMATOLOGY | Facility: CLINIC | Age: 60
End: 2025-08-06